# Patient Record
Sex: FEMALE | Race: WHITE | Employment: OTHER | URBAN - METROPOLITAN AREA
[De-identification: names, ages, dates, MRNs, and addresses within clinical notes are randomized per-mention and may not be internally consistent; named-entity substitution may affect disease eponyms.]

---

## 2017-02-13 RX ORDER — ERGOCALCIFEROL 1.25 MG/1
CAPSULE ORAL
Qty: 12 CAP | Refills: 2 | OUTPATIENT
Start: 2017-02-13

## 2017-02-13 NOTE — TELEPHONE ENCOUNTER
Juan Manuel Koenig [phone] advising patient that medication refill has been denied. She was asked to call [facility] to schedule follow up so medication can be refilled.

## 2017-02-14 ENCOUNTER — PATIENT MESSAGE (OUTPATIENT)
Dept: FAMILY MEDICINE CLINIC | Age: 62
End: 2017-02-14

## 2017-02-14 DIAGNOSIS — E55.9 VITAMIN D DEFICIENCY: ICD-10-CM

## 2017-02-14 DIAGNOSIS — E78.00 HYPERCHOLESTEROLEMIA: Primary | ICD-10-CM

## 2017-03-02 ENCOUNTER — HOSPITAL ENCOUNTER (OUTPATIENT)
Dept: LAB | Age: 62
Discharge: HOME | End: 2017-03-02
Payer: OTHER GOVERNMENT

## 2017-03-02 LAB
25(OH)D3 SERPL-MCNC: 54.6 NG/ML (ref 30–100)
ALBUMIN SERPL BCP-MCNC: 3.7 G/DL (ref 3.4–5)
ALBUMIN/GLOB SERPL: 1 {RATIO} (ref 0.8–1.7)
ALP SERPL-CCNC: 79 U/L (ref 45–117)
ALT SERPL-CCNC: 57 U/L (ref 13–56)
ANION GAP BLD CALC-SCNC: 4 MMOL/L (ref 3–18)
AST SERPL W P-5'-P-CCNC: 34 U/L (ref 15–37)
BILIRUB SERPL-MCNC: 0.6 MG/DL (ref 0.2–1)
BUN SERPL-MCNC: 11 MG/DL (ref 7–18)
BUN/CREAT SERPL: 14 (ref 12–20)
CALCIUM SERPL-MCNC: 9.1 MG/DL (ref 8.5–10.1)
CHLORIDE SERPL-SCNC: 104 MMOL/L (ref 100–108)
CHOLEST SERPL-MCNC: 208 MG/DL
CO2 SERPL-SCNC: 32 MMOL/L (ref 21–32)
CREAT SERPL-MCNC: 0.76 MG/DL (ref 0.6–1.3)
ERYTHROCYTE [DISTWIDTH] IN BLOOD BY AUTOMATED COUNT: 13.7 % (ref 11.6–14.5)
GLOBULIN SER CALC-MCNC: 3.6 G/DL (ref 2–4)
GLUCOSE SERPL-MCNC: 99 MG/DL (ref 74–99)
HCT VFR BLD AUTO: 40.9 % (ref 35–45)
HDLC SERPL-MCNC: 43 MG/DL (ref 40–60)
HDLC SERPL: 4.8 {RATIO} (ref 0–5)
HGB BLD-MCNC: 13.4 G/DL (ref 12–16)
LDLC SERPL CALC-MCNC: 123.6 MG/DL (ref 0–100)
LIPID PROFILE,FLP: ABNORMAL
MCH RBC QN AUTO: 28.1 PG (ref 24–34)
MCHC RBC AUTO-ENTMCNC: 32.8 G/DL (ref 31–37)
MCV RBC AUTO: 85.7 FL (ref 74–97)
PLATELET # BLD AUTO: 300 K/UL (ref 135–420)
PMV BLD AUTO: 11 FL (ref 9.2–11.8)
POTASSIUM SERPL-SCNC: 4.4 MMOL/L (ref 3.5–5.5)
PROT SERPL-MCNC: 7.3 G/DL (ref 6.4–8.2)
RBC # BLD AUTO: 4.77 M/UL (ref 4.2–5.3)
SODIUM SERPL-SCNC: 140 MMOL/L (ref 136–145)
TRIGL SERPL-MCNC: 207 MG/DL (ref ?–150)
VLDLC SERPL CALC-MCNC: 41.4 MG/DL
WBC # BLD AUTO: 8.4 K/UL (ref 4.6–13.2)

## 2017-03-02 PROCEDURE — 80061 LIPID PANEL: CPT | Performed by: FAMILY MEDICINE

## 2017-03-02 PROCEDURE — 85027 COMPLETE CBC AUTOMATED: CPT | Performed by: FAMILY MEDICINE

## 2017-03-02 PROCEDURE — 80053 COMPREHEN METABOLIC PANEL: CPT | Performed by: FAMILY MEDICINE

## 2017-03-02 PROCEDURE — 36415 COLL VENOUS BLD VENIPUNCTURE: CPT | Performed by: FAMILY MEDICINE

## 2017-03-02 PROCEDURE — 82306 VITAMIN D 25 HYDROXY: CPT | Performed by: FAMILY MEDICINE

## 2017-03-03 NOTE — PROGRESS NOTES
Spoke with patient. She was advised that the vit D levels look much better. And her overall labs look good. She voices understanding and has been scheduled for 3/21/17.

## 2017-03-08 DIAGNOSIS — F41.9 ANXIETY: ICD-10-CM

## 2017-03-08 DIAGNOSIS — E78.5 HYPERLIPIDEMIA WITH TARGET LDL LESS THAN 130: ICD-10-CM

## 2017-03-08 RX ORDER — VENLAFAXINE HYDROCHLORIDE 75 MG/1
75 CAPSULE, EXTENDED RELEASE ORAL DAILY
Qty: 90 CAP | Refills: 0 | Status: SHIPPED | OUTPATIENT
Start: 2017-03-08 | End: 2017-03-21 | Stop reason: SDUPTHER

## 2017-03-08 RX ORDER — ATORVASTATIN CALCIUM 40 MG/1
40 TABLET, FILM COATED ORAL
Qty: 90 TAB | Refills: 0 | Status: SHIPPED | OUTPATIENT
Start: 2017-03-08 | End: 2017-03-21 | Stop reason: SDUPTHER

## 2017-03-08 NOTE — TELEPHONE ENCOUNTER
From: Scott Mac  To: Linette Hancock MD  Sent: 3/8/2017 3:09 PM EST  Subject: Medication Renewal Request    Original authorizing provider: MD Scott Knowles would like a refill of the following medications:  venlafaxine-SR (EFFEXOR-XR) 75 mg capsule Linette Hancock MD]  atorvastatin (LIPITOR) 40 mg tablet Linette Hancock MD]    Preferred pharmacy: [place]    Comment:  Can I get these two medications refilled through [facility]. I will be running out before my appt on Mar 21. Thank you!

## 2017-03-21 ENCOUNTER — OFFICE VISIT (OUTPATIENT)
Dept: FAMILY MEDICINE CLINIC | Age: 62
End: 2017-03-21

## 2017-03-21 VITALS
WEIGHT: 169 LBS | RESPIRATION RATE: 16 BRPM | TEMPERATURE: 98.2 F | OXYGEN SATURATION: 98 % | DIASTOLIC BLOOD PRESSURE: 74 MMHG | SYSTOLIC BLOOD PRESSURE: 130 MMHG | BODY MASS INDEX: 29.95 KG/M2 | HEART RATE: 72 BPM | HEIGHT: 63 IN

## 2017-03-21 DIAGNOSIS — F41.9 ANXIETY: ICD-10-CM

## 2017-03-21 DIAGNOSIS — E55.9 VITAMIN D DEFICIENCY: ICD-10-CM

## 2017-03-21 DIAGNOSIS — E78.5 HYPERLIPIDEMIA WITH TARGET LDL LESS THAN 130: Primary | ICD-10-CM

## 2017-03-21 DIAGNOSIS — G43.809 OTHER MIGRAINE WITHOUT STATUS MIGRAINOSUS, NOT INTRACTABLE: ICD-10-CM

## 2017-03-21 DIAGNOSIS — K76.0 FATTY LIVER: ICD-10-CM

## 2017-03-21 DIAGNOSIS — Z80.8 FAMILY HISTORY OF SKIN CANCER: ICD-10-CM

## 2017-03-21 RX ORDER — ATORVASTATIN CALCIUM 40 MG/1
40 TABLET, FILM COATED ORAL
Qty: 90 TAB | Refills: 2 | Status: SHIPPED | OUTPATIENT
Start: 2017-03-21 | End: 2018-04-09 | Stop reason: SDUPTHER

## 2017-03-21 RX ORDER — ERGOCALCIFEROL 1.25 MG/1
50000 CAPSULE ORAL
Qty: 12 CAP | Refills: 3 | Status: SHIPPED | OUTPATIENT
Start: 2017-03-21 | End: 2018-04-09 | Stop reason: SDUPTHER

## 2017-03-21 RX ORDER — ZOLMITRIPTAN 2.5 MG/1
2.5 TABLET, FILM COATED ORAL AS NEEDED
Qty: 18 TAB | Refills: 3 | Status: SHIPPED | OUTPATIENT
Start: 2017-03-21

## 2017-03-21 RX ORDER — VENLAFAXINE HYDROCHLORIDE 75 MG/1
75 CAPSULE, EXTENDED RELEASE ORAL DAILY
Qty: 90 CAP | Refills: 2 | Status: SHIPPED | OUTPATIENT
Start: 2017-03-21 | End: 2018-04-09 | Stop reason: SDUPTHER

## 2017-03-21 NOTE — PATIENT INSTRUCTIONS
[facility]   [address] [facility], [address]  [place], [facility] [address]  [phone]         A Healthy Lifestyle: Care Instructions  Your Care Instructions  A healthy lifestyle can help you feel good, stay at a healthy weight, and have plenty of energy for both work and play. A healthy lifestyle is something you can share with your whole family. A healthy lifestyle also can lower your risk for serious health problems, such as high blood pressure, heart disease, and diabetes. You can follow a few steps listed below to improve your health and the health of your family. Follow-up care is a key part of your treatment and safety. Be sure to make and go to all appointments, and call your doctor if you are having problems. Its also a good idea to know your test results and keep a list of the medicines you take. How can you care for yourself at home? · Do not eat too much sugar, fat, or fast foods. You can still have dessert and treats now and then. The goal is moderation. · Start small to improve your eating habits. Pay attention to portion sizes, drink less juice and soda pop, and eat more fruits and vegetables. ¨ Eat a healthy amount of food. A 3-ounce serving of meat, for example, is about the size of a deck of cards. Fill the rest of your plate with vegetables and whole grains. ¨ Limit the amount of soda and sports drinks you have every day. Drink more water when you are thirsty. ¨ Eat at least 5 servings of fruits and vegetables every day. It may seem like a lot, but it is not hard to reach this goal. A serving or helping is 1 piece of fruit, 1 cup of vegetables, or 2 cups of leafy, raw vegetables. Have an apple or some carrot sticks as an afternoon snack instead of a candy bar. Try to have fruits and/or vegetables at every meal.  · Make exercise part of your daily routine. You may want to start with simple activities, such as walking, bicycling, or slow swimming.  Try to be active 30 to 60 minutes every day. You do not need to do all 30 to 60 minutes all at once. For example, you can exercise 3 times a day for 10 or 20 minutes. Moderate exercise is safe for most people, but it is always a good idea to talk to your doctor before starting an exercise program.  · Keep moving. Miley Knuckles the lawn, work in the garden, or [employer]. Take the stairs instead of the elevator at work. · If you smoke, quit. People who smoke have an increased risk for heart attack, stroke, cancer, and other lung illnesses. Quitting is hard, but there are ways to boost your chance of quitting tobacco for good. ¨ Use nicotine gum, patches, or lozenges. ¨ Ask your doctor about stop-smoking programs and medicines. ¨ Keep trying. In addition to reducing your risk of diseases in the future, you will notice some benefits soon after you stop using tobacco. If you have shortness of breath or asthma symptoms, they will likely get better within a few weeks after you quit. · Limit how much alcohol you drink. Moderate amounts of alcohol (up to 2 drinks a day for men, 1 drink a day for women) are okay. But drinking too much can lead to liver problems, high blood pressure, and other health problems. Family health  If you have a family, there are many things you can do together to improve your health. · Eat meals together as a family as often as possible. · Eat healthy foods. This includes fruits, vegetables, lean meats and dairy, and whole grains. · Include your family in your fitness plan. Most people think of activities such as jogging or tennis as the way to fitness, but there are many ways you and your family can be more active. Anything that makes you breathe hard and gets your heart pumping is exercise. Here are some tips:  ¨ Walk to do errands or to take your child to school or the bus. ¨ Go for a family bike ride after dinner instead of watching TV. Where can you learn more? Go to http://graciela-emani.info/.   Enter T142 in the search box to learn more about \"A Healthy Lifestyle: Care Instructions. \"  Current as of: July 26, 2016  Content Version: 11.1  © 8196-0136 [employer], [employer]. Care instructions adapted under license by [employer] (which disclaims liability or warranty for this information). If you have questions about a medical condition or this instruction, always ask your healthcare professional. [address] any warranty or liability for your use of this information.

## 2017-03-21 NOTE — PROGRESS NOTES
SUBJECTIVE  Chief Complaint   Patient presents with    Medication Refill     hyperchol, anxiety, vit d def      Follow-up for lab review for hypercholesterolemia, anxiety, liver cyst, fatty liver, and hypovitaminosis D. She is overdue by a few months. She is overdue to see Dr. Nati Sr for her annual as well by a few months. Hypercholesterolemia - Taking Lipitor 40mg with no side effects. No myalgias or cramping reported. No chest pain or dyspnea. She is overdue to see GI. She is up to date on colorectal cancer screening until 9/2017 due to an 8mm polyp. The patient presents says that her anxiety is very well-controlled on Effexor. The patient denies any harmful ideation. Takes Zomig as needed for rare migraines. OBJECTIVE    Blood pressure 130/74, pulse 72, temperature 98.2 °F (36.8 °C), temperature source Oral, resp. rate 16, height 5' 3\" (1.6 m), weight 169 lb (76.7 kg), SpO2 98 %. General:  Alert, cooperative, well appearing, in no apparent distress. Chest: clear, no w/r/r. Heart: normal s1s2, RRR, no murmurs. Ext: no swelling present. Psych: normal affect. Mood good. Oriented x 3. Judgement and insight intact.      Results for orders placed or performed during the hospital encounter of 03/02/17   CBC W/O DIFF   Result Value Ref Range    WBC 8.4 4.6 - 13.2 K/uL    RBC 4.77 4.20 - 5.30 M/uL    HGB 13.4 12.0 - 16.0 g/dL    HCT 40.9 35.0 - 45.0 %    MCV 85.7 74.0 - 97.0 FL    MCH 28.1 24.0 - 34.0 PG    MCHC 32.8 31.0 - 37.0 g/dL    RDW 13.7 11.6 - 14.5 %    PLATELET 663 220 - 056 K/uL    MPV 11.0 9.2 - 11.8 FL   LIPID PANEL   Result Value Ref Range    LIPID PROFILE          Cholesterol, total 208 (H) <200 MG/DL    Triglyceride 207 (H) <150 MG/DL    HDL Cholesterol 43 40 - 60 MG/DL    LDL, calculated 123.6 (H) 0 - 100 MG/DL    VLDL, calculated 41.4 MG/DL    CHOL/HDL Ratio 4.8 0 - 5.0     METABOLIC PANEL, COMPREHENSIVE   Result Value Ref Range    Sodium 140 136 - 145 mmol/L Potassium 4.4 3.5 - 5.5 mmol/L    Chloride 104 100 - 108 mmol/L    CO2 32 21 - 32 mmol/L    Anion gap 4 3.0 - 18 mmol/L    Glucose 99 74 - 99 mg/dL    BUN 11 7.0 - 18 MG/DL    Creatinine 0.76 0.6 - 1.3 MG/DL    BUN/Creatinine ratio 14 12 - 20      GFR est AA >60 >60 ml/min/1.73m2    GFR est non-AA >60 >60 ml/min/1.73m2    Calcium 9.1 8.5 - 10.1 MG/DL    Bilirubin, total 0.6 0.2 - 1.0 MG/DL    ALT (SGPT) 57 (H) 13 - 56 U/L    AST (SGOT) 34 15 - 37 U/L    Alk. phosphatase 79 45 - 117 U/L    Protein, total 7.3 6.4 - 8.2 g/dL    Albumin 3.7 3.4 - 5.0 g/dL    Globulin 3.6 2.0 - 4.0 g/dL    A-G Ratio 1.0 0.8 - 1.7     VITAMIN D, 25 HYDROXY   Result Value Ref Range    Vitamin D 25-Hydroxy 54.6 30 - 100 ng/mL     ASSESSMENT / PLAN    ICD-10-CM ICD-9-CM    1. Hyperlipidemia with target LDL less than 130 E78.5 272.4 atorvastatin (LIPITOR) 40 mg tablet   2. Anxiety F41.9 300.00 venlafaxine-SR (EFFEXOR-XR) 75 mg capsule   3. Vitamin D deficiency E55.9 268.9    4. Fatty liver K76.0 571.8    5. Other migraine without status migrainosus, not intractable G43.809  ZOLMitriptan (ZOMIG) 2.5 mg tablet   6. Family history of skin cancer Z80.8 V16.8      Reviewed labs - no hepatotoxicity. Hyperlipidemia - Continue Lipitor 40mg nightly. Encourage diet and exercise. Anxiety - Continue Effexor. Well-controlled. Vit D def - controlled. Continue vitamin D 50,000 weekly. Check again in 12 months. Liver cyst / fatty liver - advised on importance of GI follow-up. Due for CRCS in Sept which I shared with her. Migraines - Zomig as needed for migraines. Advised on importance of CPE / well woman exam.     Ronnie Oden on seeing a dermatologist and provided contact info due to her family history of skin ca in her father. All chart history elements were reviewed by me at the time of the visit even though marked at time of note closure. Patient understands our medical plan.  Patient has provided input and agrees with goals. Alternatives have been explained and offered. All questions answered. The patient is to call if condition worsens or fails to improve.      Follow-up Disposition:  Return 2-4 weeks for well woman exam with Dr. Goldy Tobar.

## 2017-03-21 NOTE — MR AVS SNAPSHOT
Visit Information Date & Time Provider Department Dept. Phone Encounter #  
 3/21/2017  9:15 AM Madelaine Erazo, [address] [id number] Follow-up Instructions Return 2-4 weeks for well woman exam with Dr. Mir Canal Date Due  
 PAP AKA CERVICAL CYTOLOGY 8/2/2016 ZOSTER VACCINE AGE 60> 10/15/2016 COLONOSCOPY 9/12/2017 BREAST CANCER SCRN MAMMOGRAM 1/28/2018 DTaP/Tdap/Td series (2 - Td) 7/25/2023 Allergies as of 3/21/2017  Review Complete On: 3/21/2017 By: Madelaine Erazo MD  
 No Known Allergies Current Immunizations  Reviewed on 3/21/2017 Name Date Influenza Vaccine Split 11/1/2012 Tdap 7/25/2013 Reviewed by Olinda Diggs on 3/21/2017 at  9:04 AM  
You Were Diagnosed With   
  
 Codes Comments Hyperlipidemia with target LDL less than 130    -  Primary ICD-10-CM: E78.5 ICD-9-CM: 272.4 Anxiety     ICD-10-CM: F41.9 ICD-9-CM: 300.00 Vitamin D deficiency     ICD-10-CM: E55.9 ICD-9-CM: 268.9 Fatty liver     ICD-10-CM: K76.0 ICD-9-CM: 571.8 Other migraine without status migrainosus, not intractable     ICD-10-CM: X27.880 Family history of skin cancer     ICD-10-CM: Z80.8 ICD-9-CM: V16.8 Vitals BP Pulse Temp Resp Height(growth percentile) Weight(growth percentile) 130/74 (BP 1 Location: Left arm, BP Patient Position: Sitting) 72 98.2 °F (36.8 °C) (Oral) 16 5' 3\" (1.6 m) 169 lb (76.7 kg) SpO2 BMI OB Status Smoking Status 98% 29.94 kg/m2 Postmenopausal Never Smoker Vitals History BMI and BSA Data Body Mass Index Body Surface Area  
 29.94 kg/m 2 1.85 m 2 [facility] [address] [phone] Your Updated Medication List  
  
   
This list is accurate as of: 3/21/17  9:51 AM.  Always use your most recent med list.  
  
  
  
  
 atorvastatin 40 mg tablet Commonly known as:  LIPITOR Take 1 Tab by mouth nightly.  
  
 ergocalciferol 50,000 unit capsule Commonly known as:  ERGOCALCIFEROL Take 1 Cap by mouth every seven (7) days. MULTIVITAMIN PO Take 1 Tab by mouth daily. venlafaxine-SR 75 mg capsule Commonly known as:  EFFEXOR-XR Take 1 Cap by mouth daily. VITAMIN E PO Take 800 mg by mouth daily. ZOLMitriptan 2.5 mg tablet Commonly known as:  ZOMIG Take 1 Tab by mouth as needed for Migraine. Prescriptions Sent to Pharmacy Refills  
 ergocalciferol (ERGOCALCIFEROL) 50,000 unit capsule 3 Sig: Take 1 Cap by mouth every seven (7) days. Class: Normal  
 Pharmacy: [address], [address] Ph #: [phone] Route: Oral  
 atorvastatin (LIPITOR) 40 mg tablet 2 Sig: Take 1 Tab by mouth nightly. Class: Normal  
 Pharmacy: [address], [address] Ph #: [phone] Route: Oral  
 venlafaxine-SR (EFFEXOR-XR) 75 mg capsule 2 Sig: Take 1 Cap by mouth daily. Class: Normal  
 Pharmacy: [address], [address] Ph #: [phone] Route: Oral  
 ZOLMitriptan (ZOMIG) 2.5 mg tablet 3 Sig: Take 1 Tab by mouth as needed for Migraine. Class: Normal  
 Pharmacy: [address], [address] Ph #: [phone] Route: Oral  
  
Follow-up Instructions Return 2-4 weeks for well woman exam with Dr. Nati Sr.  
  
  
Patient Instructions [facility] [address] [place] [address], [address] 
[phone] [facility]! Dear Ruth Bone: Thank you for requesting a [employer] account. Our records indicate that you already have an active [employer] account. You can access your account anytime at [email or web address] Did you know that you can access your hospital and ER discharge instructions at any time in [employer]? You can also review all of your test results from your hospital stay or ER visit. Additional Information If you have questions, please visit the Frequently Asked Questions section of the [employer] website at [email or web address] Remember, SwingTime is NOT to be used for urgent needs. For medical emergencies, dial 911. Now available from your iPhone and Android! Please provide this summary of care documentation to your next provider. Your primary care clinician is listed as Mihai Low. If you have any questions after today's visit, please call [phone].

## 2017-03-21 NOTE — PROGRESS NOTES
1. Have you been to the ER, urgent care clinic since your last visit? Hospitalized since your last visit? No    2. Have you seen or consulted any other health care providers outside of the [address] [place] [address] since your last visit? Include any pap smears or colon screening.  No

## 2017-04-25 ENCOUNTER — OFFICE VISIT (OUTPATIENT)
Dept: FAMILY MEDICINE CLINIC | Age: 62
End: 2017-04-25

## 2017-04-25 VITALS
DIASTOLIC BLOOD PRESSURE: 91 MMHG | BODY MASS INDEX: 29.41 KG/M2 | HEIGHT: 63 IN | TEMPERATURE: 99.1 F | HEART RATE: 98 BPM | SYSTOLIC BLOOD PRESSURE: 158 MMHG | WEIGHT: 166 LBS | OXYGEN SATURATION: 93 %

## 2017-04-25 DIAGNOSIS — R03.0 ELEVATED BLOOD PRESSURE READING: ICD-10-CM

## 2017-04-25 DIAGNOSIS — M79.604 PAIN OF RIGHT LOWER EXTREMITY: Primary | ICD-10-CM

## 2017-04-25 DIAGNOSIS — M54.30 SCIATIC LEG PAIN: ICD-10-CM

## 2017-04-25 RX ORDER — IBUPROFEN 800 MG/1
800 TABLET ORAL
COMMUNITY
End: 2020-08-18

## 2017-04-25 RX ORDER — KETOROLAC TROMETHAMINE 30 MG/ML
60 INJECTION, SOLUTION INTRAMUSCULAR; INTRAVENOUS ONCE
Qty: 1 VIAL | Refills: 0
Start: 2017-04-25 | End: 2017-04-25

## 2017-04-25 NOTE — PATIENT INSTRUCTIONS
Leg Pain: Care Instructions  Your Care Instructions  Many things can cause leg pain. Too much exercise or overuse can cause a muscle cramp (or charley horse). You can get leg cramps from not eating a balanced diet that has enough potassium, calcium, and other minerals. If you do not drink enough fluids or are taking certain medicines, you may develop leg cramps. Other causes of leg pain include injuries, blood flow problems, nerve damage, and twisted and enlarged veins (varicose veins). You can usually ease pain with self-care. Your doctor may recommend that you rest your leg and keep it elevated. Follow-up care is a key part of your treatment and safety. Be sure to make and go to all appointments, and call your doctor if you are having problems. Its also a good idea to know your test results and keep a list of the medicines you take. How can you care for yourself at home? · Take pain medicines exactly as directed. ¨ If the doctor gave you a prescription medicine for pain, take it as prescribed. ¨ If you are not taking a prescription pain medicine, ask your doctor if you can take an over-the-counter medicine. · Take any other medicines exactly as prescribed. Call your doctor if you think you are having a problem with your medicine. · Rest your leg while you have pain, and avoid standing for long periods of time. · Prop up your leg at or above the level of your heart when possible. · Make sure you are eating a balanced diet that is rich in calcium, potassium, and magnesium, especially if you are pregnant. · If directed by your doctor, put ice or a cold pack on the area for 10 to 20 minutes at a time. Put a thin cloth between the ice and your skin. · Your leg may be in a splint, a brace, or an elastic bandage, and you may have crutches to help you walk. Follow your doctor's directions about how long to wear supports and how to use the crutches. When should you call for help?   Call 911 anytime you think you may need emergency care. For example, call if:  · You have sudden chest pain and shortness of breath, or you cough up blood. · Your leg is cool or pale or changes color. Call your doctor now or seek immediate medical care if:  · You have increasing or severe pain. · Your leg suddenly feels weak and you cannot move it. · You have signs of a blood clot, such as:  ¨ Pain in your calf, back of the knee, thigh, or groin. ¨ Redness and swelling in your leg or groin. · You have signs of infection, such as:  ¨ Increased pain, swelling, warmth, or redness. ¨ Red streaks leading from the sore area. ¨ Pus draining from a place on your leg. ¨ A fever. · You cannot bear weight on your leg. Watch closely for changes in your health, and be sure to contact your doctor if:  · You do not get better as expected. Where can you learn more? Go to http://graciela-emani.info/. Enter H301 in the search box to learn more about \"Leg Pain: Care Instructions. \"  Current as of: May 27, 2016  Content Version: 11.2  © 4014-9458 [employer]. Care instructions adapted under license by [employer] (which disclaims liability or warranty for this information). If you have questions about a medical condition or this instruction, always ask your healthcare professional. [address] any warranty or liability for your use of this information. Sciatica: Care Instructions  Your Care Instructions    Sciatica (say \"gmt-HA-uz-kuh\") is an irritation of one of the sciatic nerves, which come from the spinal cord in the lower back. The sciatic nerves and their branches extend down through the buttock to the foot. Sciatica can develop when an injured disc in the back presses against a spinal nerve root. Its main symptom is pain, numbness, or weakness that is often worse in the leg or foot than in the back. Sciatica often will improve and go away with time.  Early treatment usually includes medicines and exercises to relieve pain. Follow-up care is a key part of your treatment and safety. Be sure to make and go to all appointments, and call your doctor if you are having problems. It's also a good idea to know your test results and keep a list of the medicines you take. How can you care for yourself at home? · Take pain medicines exactly as directed. ¨ If the doctor gave you a prescription medicine for pain, take it as prescribed. ¨ If you are not taking a prescription pain medicine, ask your doctor if you can take an over-the-counter medicine. · Use heat or ice to relieve pain. ¨ To apply heat, put a warm water bottle, heating pad set on low, or warm cloth on your back. Do not go to sleep with a heating pad on your skin. ¨ To use ice, put ice or a cold pack on the area for 10 to 20 minutes at a time. Put a thin cloth between the ice and your skin. · Avoid sitting if possible, unless it feels better than standing. · Alternate lying down with short walks. Increase your walking distance as you are able to without making your symptoms worse. · Do not do anything that makes your symptoms worse. When should you call for help? Call 911 anytime you think you may need emergency care. For example, call if:  · You are unable to move a leg at all. Call your doctor now or seek immediate medical care if:  · You have new or worse symptoms in your legs or buttocks. Symptoms may include:  ¨ Numbness or tingling. ¨ Weakness. ¨ Pain. · You lose bladder or bowel control. Watch closely for changes in your health, and be sure to contact your doctor if:  · You are not getting better as expected. Where can you learn more? Go to http://graciela-emani.info/. Enter [phone] in the search box to learn more about \"Sciatica: Care Instructions. \"  Current as of: May 23, 2016  Content Version: 11.2  © 3304-3820 [employer].  Care instructions adapted under license by [facility] (which disclaims liability or warranty for this information). If you have questions about a medical condition or this instruction, always ask your healthcare professional. [address] any warranty or liability for your use of this information. Ketorolac (By mouth)   Ketorolac Tromethamine (yousif-toe-ROLE-ak troe-METH-a-meen)  Treats severe pain. This medicine is an NSAID. Brand Name(s):Toradol   There may be other brand names for this medicine. When This Medicine Should Not Be Used: You should not use this medicine if you have had an allergic reaction (including asthma) to ketorolac, aspirin, or other NSAID medicines such as Aleve®, Celebrex®, Indocin®, Motrin®, or Naprosyn®. You should not use this medicine if you have a stomach ulcer, a bleeding disorder, or if you are pregnant or breastfeeding. Do not take this medicine if you have advanced kidney disease. Do not use this medicine right before or right after having coronary artery bypass graft (CABG), a type of heart surgery. You should not take this medicine if you are using probenecid (Probalan®). How to Use This Medicine:   Tablet  · Your doctor will tell you how much medicine to use. Do not use more than directed. · Take your tablets with a full glass of water. · You may take this medicine with food or milk so it does not upset your stomach. · Use this medicine for the shortest time possible, never more than 5 days, and in the smallest dose possible. This will help lower the risk of side effects. · This medicine should come with a Medication Guide. Ask your pharmacist for a copy if you do not have one. If a dose is missed:   · Take a dose as soon as you remember. If it is almost time for your next dose, wait until then and take a regular dose. Do not take extra medicine to make up for a missed dose.   How to Store and Dispose of This Medicine:   · Store the medicine in a closed container at room temperature, away from heat, moisture, and direct light. · Ask your pharmacist, doctor, or health caregiver about the best way to dispose of any outdated medicine or medicine no longer needed. · Keep all medicine out of the reach of children. Never share your medicine with anyone. Drugs and Foods to Avoid:   Ask your doctor or pharmacist before using any other medicine, including over-the-counter medicines, vitamins, and herbal products. · Do not use any other NSAID medicine unless your doctor says it is okay. Some other NSAIDs are aspirin, diclofenac, ibuprofen, naproxen, Advil®, Aleve®, Celebrex®, Ecotrin®, Motrin®, or Voltaren®. · Make sure your doctor knows if you are also using aspirin, a blood thinner (such as warfarin, Coumadin®), or a steroid medicine (such as cortisone, dexamethasone, hydrocortisone, methylprednisolone, prednisolone, prednisone, or Orapred®). Tell your doctor if you are using methotrexate [place]), or a diuretic or \"water pill\" (such as furosemide, hydrochlorothiazide [HCTZ], torsemide, Demadex®, or Lasix®). · Make sure your doctor knows if you are also using fluoxetine, heparin, lithium, thiothixene, Eskalith®, Navane®, or Prozac®. Tell your doctor if you are using a blood pressure medicine (such as enalapril, lisinopril, Accupril®, Lotensin®, Lotrel®, Monopril®, Prinivil®, Vasotec®, or Zestril®). Your doctor will need to know if you are using medicine to treat seizures such as phenytoin (Dilantin®) or carbamazepine (Tegretol®), or sedatives such as alprazolam (Xanax®). Warnings While Using This Medicine:   · Make sure your doctor knows if you have kidney disease, liver disease, heart disease, circulation problems, untreated high blood pressure, or a history of asthma. · If you are more than 12years of age, you should not use this medicine for more than 5 days unless your doctor has told you to. · If you are 12years of age or younger, you should not use more than a single dose.   · This medicine may raise your risk of having a heart attack or stroke. This is more likely in people who already have heart disease. People who use this medicine for a long time might also have a higher risk. · This medicine may cause bleeding in your stomach or intestines. These problems can happen without warning signs. This is more likely if you have had a stomach ulcer in the past, if you smoke or drink alcohol regularly, if you are over 61years old, if you are in poor health, or if you are using certain other medicines (a steroid medicine or a blood thinner). · This medicine may make you dizzy or drowsy. Avoid driving, using machines, or doing anything else that could be dangerous if you are not alert. Possible Side Effects While Using This Medicine:   Call your doctor right away if you notice any of these side effects:  · Allergic reaction: Itching or hives, swelling in your face or hands, swelling or tingling in your mouth or throat, chest tightness, trouble breathing  · Blistering, peeling, or red skin rash. · Bloody or black, tarry stools. · Change in how much or how often you urinate. · Chest pain, shortness of breath, or coughing up blood. · Dark-colored urine or pale stools. · Flu-like symptoms. · Numbness or weakness in your arm or leg, or on one side of your body. · Pain in your lower leg (calf). · Problems with vision, speech, or walking. · Shortness of breath, cold sweat, and bluish-colored skin. · Skin rash or blisters with fever. · Sudden and severe stomach pain, nausea, vomiting, fever, and lightheadedness. · Sudden or severe headache. · Swelling in your hands, ankles, or feet. · Unusual bleeding or bruising. · Unusual tiredness or weakness. · Unusual weight gain. · Vomiting blood or something that looks like coffee grounds. · Yellowing of your skin or the whites of your eyes.   If you notice these less serious side effects, talk with your doctor:   · Changes in your vision. · Diarrhea, constipation, or indigestion. · Headache. · Mild stomach pain. · Ringing in your ears. If you notice other side effects that you think are caused by this medicine, tell your doctor. Call your doctor for medical advice about side effects. You may report side effects to FDA at 5-812-FDA-7535  © 2016 [address] is for End User's use only and may not be sold, redistributed or otherwise used for commercial purposes. The above information is an  only. It is not intended as medical advice for individual conditions or treatments. Talk to your doctor, nurse or pharmacist before following any medical regimen to see if it is safe and effective for you.

## 2017-04-25 NOTE — PROGRESS NOTES
1. Have you been to the ER, urgent care clinic since your last visit? Hospitalized since your last visit? No    2. Have you seen or consulted any other health care providers outside of the [facility] since your last visit? Include any pap smears or colon screening. No    Is someone accompanying this pt? no    Is the patient using any DME equipment during OV? no      Chief Complaint   Patient presents with    Back Pain     Pt states that she has been having sciatic nerve pain since Saturday. Pt states that she is currently taking motrin 800 mg. Pt states that it gives some rlief. Pt states that the pain is only severe when sitting.

## 2017-04-25 NOTE — PROGRESS NOTES
HPI  Dameon Nunez is a 62 y.o. female  Chief Complaint   Patient presents with    Back Pain     Pt states that she has been having sciatic nerve pain since Saturday. Pt states that she is currently taking motrin 800 mg. Pt states that it gives some rlief. Pt states that the pain is only severe when sitting. Reports this pain occurred at [place] and she took ibuprofen which caused the pain to dissolve by the third day. Reports ibuprofen does give some relief. Reports pain only occurs when sitting. Reports pain is zheng and tingling in right leg and is 7/10. Patient reports pain starts in the middle of her buttock area and radiates down to right leg. Reports pain has been present since Saturday (three days ago). Reports pain has decreased since Saturday (three days ago) but has not gone away. Denies spasms. Denies back pain. Denies calf pain. Reports blood pressure is elevated today and she thinks it is because she is in pain. Past Medical History  Past Medical History:   Diagnosis Date    Elevated blood pressure reading without diagnosis of hypertension     Fatty liver     sees GI    Fibroadenoma of right breast 8/2013    rpt US 3/14    Generalized anxiety disorder     High cholesterol     Hx of colonoscopy 09/12/2012    Dr. Herman Clay, due 9/2017, 8mm polyp    Liver cyst     sees GI, recommended 1 year follow-up (11/2014 due)    Migraine without aura, without mention of intractable migraine without mention of status migrainosus     Osteopenia     Other and unspecified hyperlipidemia     Psychiatric disorder     anxiety       Surgical History  History reviewed. No pertinent surgical history. Medications  Current Outpatient Prescriptions   Medication Sig Dispense Refill    ibuprofen (MOTRIN) 800 mg tablet Take 800 mg by mouth every six (6) hours as needed for Pain.  ergocalciferol (ERGOCALCIFEROL) 50,000 unit capsule Take 1 Cap by mouth every seven (7) days.  12 Cap 3    atorvastatin (LIPITOR) 40 mg tablet Take 1 Tab by mouth nightly. 90 Tab 2    venlafaxine-SR (EFFEXOR-XR) 75 mg capsule Take 1 Cap by mouth daily. 90 Cap 2    ZOLMitriptan (ZOMIG) 2.5 mg tablet Take 1 Tab by mouth as needed for Migraine. 18 Tab 3    MULTIVITAMIN PO Take 1 Tab by mouth daily.  VITAMIN E ACETATE (VITAMIN E PO) Take 800 mg by mouth daily. Allergies  No Known Allergies    Family History  Family History   Problem Relation Age of Onset    Hypertension Father     High Cholesterol Father     Cancer Mother      brain    Osteoporosis Mother     Stroke Mother     Other Mother      skin ca - basal cell    Diabetes Paternal Grandfather     Heart Attack Paternal Grandfather     Stroke Paternal Grandmother        Social History  Social History     Social History    Marital status:      Spouse name: N/A    Number of children: N/A    Years of education: N/A     Occupational History    stay at home mom      Social History Main Topics    Smoking status: Never Smoker    Smokeless tobacco: Never Used    Alcohol use 1.0 oz/week     2 Glasses of wine per week      Comment: occ    Drug use: No    Sexual activity: Yes     Partners: Male     Other Topics Concern    Not on file     Social History Narrative       Problem List  Patient Active Problem List   Diagnosis Code    Migraines G43.909    Anxiety F41.9    Hyperlipidemia with target LDL less than 130 E78.5    Right-sided chest wall pain R07.89    Vitamin D deficiency E55.9    Fatty liver K76.0       Review of Systems  Review of Systems   Constitutional: Negative for chills and fever. Respiratory: Negative for shortness of breath. Cardiovascular: Negative for chest pain and palpitations. Gastrointestinal: Negative for abdominal pain, diarrhea, nausea and vomiting. Musculoskeletal: Negative for back pain and falls. Neurological: Positive for tingling. Negative for dizziness.        Vital Signs  Vitals:    04/25/17 1156   BP: (!) 158/91   Pulse: 98   Temp: 99.1 °F (37.3 °C)   TempSrc: Oral   SpO2: 93%   Weight: 166 lb (75.3 kg)   Height: 5' 3\" (1.6 m)   PainSc:   7       Physical Exam  Physical Exam    Diagnostics  Orders Placed This Encounter    KETOROLAC TROMETHAMINE INJ     Order Specific Question:   Dose     Answer:   60mg     Order Specific Question:   Site     Answer:   RIGHT GLUTEUS     Order Specific Question:   Expiration Date     Answer:   2018     Order Specific Question:   Lot#     Answer:   [id number]     Order Specific Question:        Answer:   [employer] Kabi     Order Specific Question:   Charge Quantity? Answer:   4     Order Specific Question:   Perfomed by/Witnessed by: Answer:   Sanchez Brennan LPN     Order Specific Question:   NDC#     Answer:   15607-741-50    MI THER/PROPH/DIAG INJECTION, SUBCUT/IM    ibuprofen (MOTRIN) 800 mg tablet     Sig: Take 800 mg by mouth every six (6) hours as needed for Pain.  ketorolac tromethamine (TORADOL) 60 mg/2 mL soln     Si mL by IntraMUSCular route once for 1 dose.      Dispense:  1 Vial     Refill:  0       Results  Results for orders placed or performed during the hospital encounter of 17   CBC W/O DIFF   Result Value Ref Range    WBC 8.4 4.6 - 13.2 K/uL    RBC 4.77 4.20 - 5.30 M/uL    HGB 13.4 12.0 - 16.0 g/dL    HCT 40.9 35.0 - 45.0 %    MCV 85.7 74.0 - 97.0 FL    MCH 28.1 24.0 - 34.0 PG    MCHC 32.8 31.0 - 37.0 g/dL    RDW 13.7 11.6 - 14.5 %    PLATELET 785 268 - 640 K/uL    MPV 11.0 9.2 - 11.8 FL   LIPID PANEL   Result Value Ref Range    LIPID PROFILE          Cholesterol, total 208 (H) <200 MG/DL    Triglyceride 207 (H) <150 MG/DL    HDL Cholesterol 43 40 - 60 MG/DL    LDL, calculated 123.6 (H) 0 - 100 MG/DL    VLDL, calculated 41.4 MG/DL    CHOL/HDL Ratio 4.8 0 - 5.0     METABOLIC PANEL, COMPREHENSIVE   Result Value Ref Range    Sodium 140 136 - 145 mmol/L    Potassium 4.4 3.5 - 5.5 mmol/L    Chloride 104 100 - 108 mmol/L    CO2 32 21 - 32 mmol/L    Anion gap 4 3.0 - 18 mmol/L    Glucose 99 74 - 99 mg/dL    BUN 11 7.0 - 18 MG/DL    Creatinine 0.76 0.6 - 1.3 MG/DL    BUN/Creatinine ratio 14 12 - 20      GFR est AA >60 >60 ml/min/1.73m2    GFR est non-AA >60 >60 ml/min/1.73m2    Calcium 9.1 8.5 - 10.1 MG/DL    Bilirubin, total 0.6 0.2 - 1.0 MG/DL    ALT (SGPT) 57 (H) 13 - 56 U/L    AST (SGOT) 34 15 - 37 U/L    Alk. phosphatase 79 45 - 117 U/L    Protein, total 7.3 6.4 - 8.2 g/dL    Albumin 3.7 3.4 - 5.0 g/dL    Globulin 3.6 2.0 - 4.0 g/dL    A-G Ratio 1.0 0.8 - 1.7     VITAMIN D, 25 HYDROXY   Result Value Ref Range    Vitamin D 25-Hydroxy 54.6 30 - 100 ng/mL       Assessment and Plan  Edwige Sparks was seen today for back pain. Diagnoses and all orders for this visit:    Pain of right lower extremity  -     KETOROLAC TROMETHAMINE INJ  -     AL THER/PROPH/DIAG INJECTION, SUBCUT/IM    Sciatic leg pain    Elevated blood pressure reading    Other orders  -     ketorolac tromethamine (TORADOL) 60 mg/2 mL soln; 2 mL by IntraMUSCular route once for 1 dose. Discussed blood pressure with patient in detail. Discussed signs and symptoms of stroke and MI. Patient verbalized understanding. Patient to follow up with PCP within 2 weeks for blood pressure recheck. Patient to implement lifestyle modifications. Side effects of Toradol reviewed with patient and handout given. Patient advised not to take OTC Ibuprofen due to IM Ketorolac injection today. Patient instructed to use OTC tylenol if need. Patient verbalized understanding. After care summary printed and reviewed with patient. Plan reviewed with patient. Questions answered. Patient verbalized understanding of plan and is in agreement with plan. Patient to follow up in two weeks or earlier if symptoms worsen do not improve.      TRAN Esparza

## 2017-04-25 NOTE — MR AVS SNAPSHOT
Visit Information Date & Time Provider Department Dept. Phone Encounter #  
 4/25/2017 11:45 AM Amy Bunn, NP [address] [id number] Follow-up Instructions Return if symptoms worsen or fail to improve. Upcoming Health Maintenance Date Due  
 PAP AKA CERVICAL CYTOLOGY 8/2/2016 ZOSTER VACCINE AGE 60> 10/15/2016 COLONOSCOPY 9/12/2017 BREAST CANCER SCRN MAMMOGRAM 1/28/2018 DTaP/Tdap/Td series (2 - Td) 7/25/2023 Allergies as of 4/25/2017  Review Complete On: 4/25/2017 By: Kaylene Logan LPN No Known Allergies Current Immunizations  Reviewed on 3/21/2017 Name Date Influenza Vaccine Split 11/1/2012 Tdap 7/25/2013 Not reviewed this visit You Were Diagnosed With   
  
 Codes Comments Pain of right lower extremity    -  Primary ICD-10-CM: M79.604 ICD-9-CM: 729.5 Sciatic leg pain     ICD-10-CM: M54.30 ICD-9-CM: 724.3 Vitals BP Pulse Temp Height(growth percentile) Weight(growth percentile) SpO2  
 (!) 158/91 (BP 1 Location: Right arm, BP Patient Position: Sitting) 98 99.1 °F (37.3 °C) (Oral) 5' 3\" (1.6 m) 166 lb (75.3 kg) 93% BMI OB Status Smoking Status 29.41 kg/m2 Postmenopausal Never Smoker BMI and BSA Data Body Mass Index Body Surface Area  
 29.41 kg/m 2 1.83 m 2 Preferred Pharmacy Pharmacy Name Phone Shoaib Enriquez, [address] Your Updated Medication List  
  
   
This list is accurate as of: 4/25/17 12:22 PM.  Always use your most recent med list.  
  
  
  
  
 atorvastatin 40 mg tablet Commonly known as:  LIPITOR Take 1 Tab by mouth nightly.  
  
 ergocalciferol 50,000 unit capsule Commonly known as:  ERGOCALCIFEROL Take 1 Cap by mouth every seven (7) days. ibuprofen 800 mg tablet Commonly known as:  MOTRIN Take 800 mg by mouth every six (6) hours as needed for Pain. ketorolac tromethamine 60 mg/2 mL Soln Commonly known as:  TORADOL  
2 mL by IntraMUSCular route once for 1 dose. MULTIVITAMIN PO Take 1 Tab by mouth daily. venlafaxine-SR 75 mg capsule Commonly known as:  EFFEXOR-XR Take 1 Cap by mouth daily. VITAMIN E PO Take 800 mg by mouth daily. ZOLMitriptan 2.5 mg tablet Commonly known as:  ZOMIG Take 1 Tab by mouth as needed for Migraine. We Performed the Following KETOROLAC TROMETHAMINE INJ [ [facility]] ID THER/PROPH/DIAG INJECTION, SUBCUT/IM [id number] CPT(R)] Follow-up Instructions Return if symptoms worsen or fail to improve. Patient Instructions Leg Pain: Care Instructions Your Care Instructions Many things can cause leg pain. Too much exercise or overuse can cause a muscle cramp (or charley horse). You can get leg cramps from not eating a balanced diet that has enough potassium, calcium, and other minerals. If you do not drink enough fluids or are taking certain medicines, you may develop leg cramps. Other causes of leg pain include injuries, blood flow problems, nerve damage, and twisted and enlarged veins (varicose veins). You can usually ease pain with self-care. Your doctor may recommend that you rest your leg and keep it elevated. Follow-up care is a key part of your treatment and safety. Be sure to make and go to all appointments, and call your doctor if you are having problems. Its also a good idea to know your test results and keep a list of the medicines you take. How can you care for yourself at home? · Take pain medicines exactly as directed. ¨ If the doctor gave you a prescription medicine for pain, take it as prescribed. ¨ If you are not taking a prescription pain medicine, ask your doctor if you can take an over-the-counter medicine. · Take any other medicines exactly as prescribed. Call your doctor if you think you are having a problem with your medicine. · Rest your leg while you have pain, and avoid standing for long periods of time. · Prop up your leg at or above the level of your heart when possible. · Make sure you are eating a balanced diet that is rich in calcium, potassium, and magnesium, especially if you are pregnant. · If directed by your doctor, put ice or a cold pack on the area for 10 to 20 minutes at a time. Put a thin cloth between the ice and your skin. · Your leg may be in a splint, a brace, or an elastic bandage, and you may have crutches to help you walk. Follow your doctor's directions about how long to wear supports and how to use the crutches. When should you call for help? Call 911 anytime you think you may need emergency care. For example, call if: 
· You have sudden chest pain and shortness of breath, or you cough up blood. · Your leg is cool or pale or changes color. Call your doctor now or seek immediate medical care if: 
· You have increasing or severe pain. · Your leg suddenly feels weak and you cannot move it. · You have signs of a blood clot, such as: 
¨ Pain in your calf, back of the knee, thigh, or groin. ¨ Redness and swelling in your leg or groin. · You have signs of infection, such as: 
¨ Increased pain, swelling, warmth, or redness. ¨ Red streaks leading from the sore area. ¨ Pus draining from a place on your leg. ¨ A fever. · You cannot bear weight on your leg. Watch closely for changes in your health, and be sure to contact your doctor if: 
· You do not get better as expected. Where can you learn more? Go to http://graciela-emani.info/. Enter U400 in the search box to learn more about \"Leg Pain: Care Instructions. \" Current as of: May 27, 2016 Content Version: 11.2 © 9738-1826 [employer]. Care instructions adapted under license by [employer] (which disclaims liability or warranty for this information).  If you have questions about a medical condition or this instruction, always ask your healthcare professional. [place] [address] any warranty or liability for your use of this information. Sciatica: Care Instructions Your Care Instructions Sciatica (say \"zqy-CV-iu-kuh\") is an irritation of one of the sciatic nerves, which come from the spinal cord in the lower back. The sciatic nerves and their branches extend down through the buttock to the foot. Sciatica can develop when an injured disc in the back presses against a spinal nerve root. Its main symptom is pain, numbness, or weakness that is often worse in the leg or foot than in the back. Sciatica often will improve and go away with time. Early treatment usually includes medicines and exercises to relieve pain. Follow-up care is a key part of your treatment and safety. Be sure to make and go to all appointments, and call your doctor if you are having problems. It's also a good idea to know your test results and keep a list of the medicines you take. How can you care for yourself at home? · Take pain medicines exactly as directed. ¨ If the doctor gave you a prescription medicine for pain, take it as prescribed. ¨ If you are not taking a prescription pain medicine, ask your doctor if you can take an over-the-counter medicine. · Use heat or ice to relieve pain. ¨ To apply heat, put a warm water bottle, heating pad set on low, or warm cloth on your back. Do not go to sleep with a heating pad on your skin. ¨ To use ice, put ice or a cold pack on the area for 10 to 20 minutes at a time. Put a thin cloth between the ice and your skin. · Avoid sitting if possible, unless it feels better than standing. · Alternate lying down with short walks. Increase your walking distance as you are able to without making your symptoms worse. · Do not do anything that makes your symptoms worse. When should you call for help? Call 911 anytime you think you may need emergency care.  For example, call if: 
· You are unable to move a leg at all. Call your doctor now or seek immediate medical care if: 
· You have new or worse symptoms in your legs or buttocks. Symptoms may include: ¨ Numbness or tingling. ¨ Weakness. ¨ Pain. · You lose bladder or bowel control. Watch closely for changes in your health, and be sure to contact your doctor if: 
· You are not getting better as expected. Where can you learn more? Go to http://graciela-emani.info/. Enter [phone] in the search box to learn more about \"Sciatica: Care Instructions. \" Current as of: May 23, 2016 Content Version: 11.2 © 7219-5033 [employer]. Care instructions adapted under license by durchblicker.at (which disclaims liability or warranty for this information). If you have questions about a medical condition or this instruction, always ask your healthcare professional. [place] [address] any warranty or liability for your use of this information. Ketorolac (By mouth) Ketorolac Tromethamine (yousif-toe-ROLE-ak troe-METH-a-meen) Treats severe pain. This medicine is an NSAID. Brand Name(s):Toradol There may be other brand names for this medicine. When This Medicine Should Not Be Used: You should not use this medicine if you have had an allergic reaction (including asthma) to ketorolac, aspirin, or other NSAID medicines such as Aleve®, Celebrex®, Indocin®, Motrin®, or Naprosyn®. You should not use this medicine if you have a stomach ulcer, a bleeding disorder, or if you are pregnant or breastfeeding. Do not take this medicine if you have advanced kidney disease. Do not use this medicine right before or right after having coronary artery bypass graft (CABG), a type of heart surgery. You should not take this medicine if you are using probenecid (Probalan®). How to Use This Medicine:  
Tablet · Your doctor will tell you how much medicine to use. Do not use more than directed. · Take your tablets with a full glass of water. · You may take this medicine with food or milk so it does not upset your stomach. · Use this medicine for the shortest time possible, never more than 5 days, and in the smallest dose possible. This will help lower the risk of side effects. · This medicine should come with a Medication Guide. Ask your pharmacist for a copy if you do not have one. If a dose is missed: · Take a dose as soon as you remember. If it is almost time for your next dose, wait until then and take a regular dose. Do not take extra medicine to make up for a missed dose. How to Store and Dispose of This Medicine: · Store the medicine in a closed container at room temperature, away from heat, moisture, and direct light. · Ask your pharmacist, doctor, or health caregiver about the best way to dispose of any outdated medicine or medicine no longer needed. · Keep all medicine out of the reach of children. Never share your medicine with anyone. Drugs and Foods to Avoid: Ask your doctor or pharmacist before using any other medicine, including over-the-counter medicines, vitamins, and herbal products. · Do not use any other NSAID medicine unless your doctor says it is okay. Some other NSAIDs are aspirin, diclofenac, ibuprofen, naproxen, Advil®, Aleve®, Celebrex®, Ecotrin®, Motrin®, or Voltaren®. · Make sure your doctor knows if you are also using aspirin, a blood thinner (such as warfarin, Coumadin®), or a steroid medicine (such as cortisone, dexamethasone, hydrocortisone, methylprednisolone, prednisolone, prednisone, or Orapred®). Tell your doctor if you are using methotrexate Jad Finesse), or a diuretic or \"water pill\" (such as furosemide, hydrochlorothiazide [HCTZ], torsemide, Demadex®, or Lasix®). · Make sure your doctor knows if you are also using fluoxetine, heparin, lithium, thiothixene, Eskalith®, Navane®, or Prozac®.  Tell your doctor if you are using a blood pressure medicine (such as enalapril, lisinopril, Accupril®, Lotensin®, Lotrel®, Monopril®, Prinivil®, Vasotec®, or Zestril®). Your doctor will need to know if you are using medicine to treat seizures such as phenytoin (Dilantin®) or carbamazepine (Tegretol®), or sedatives such as alprazolam (Xanax®). Warnings While Using This Medicine: · Make sure your doctor knows if you have kidney disease, liver disease, heart disease, circulation problems, untreated high blood pressure, or a history of asthma. · If you are more than 12years of age, you should not use this medicine for more than 5 days unless your doctor has told you to. · If you are 12years of age or younger, you should not use more than a single dose. · This medicine may raise your risk of having a heart attack or stroke. This is more likely in people who already have heart disease. People who use this medicine for a long time might also have a higher risk. · This medicine may cause bleeding in your stomach or intestines. These problems can happen without warning signs. This is more likely if you have had a stomach ulcer in the past, if you smoke or drink alcohol regularly, if you are over 61years old, if you are in poor health, or if you are using certain other medicines (a steroid medicine or a blood thinner). · This medicine may make you dizzy or drowsy. Avoid driving, using machines, or doing anything else that could be dangerous if you are not alert. Possible Side Effects While Using This Medicine:  
Call your doctor right away if you notice any of these side effects: · Allergic reaction: Itching or hives, swelling in your face or hands, swelling or tingling in your mouth or throat, chest tightness, trouble breathing · Blistering, peeling, or red skin rash. · Bloody or black, tarry stools. · Change in how much or how often you urinate. · Chest pain, shortness of breath, or coughing up blood. · Dark-colored urine or pale stools. · Flu-like symptoms. · Numbness or weakness in your arm or leg, or on one side of your body. · Pain in your lower leg (calf). · Problems with vision, speech, or walking. · Shortness of breath, cold sweat, and bluish-colored skin. · Skin rash or blisters with fever. · Sudden and severe stomach pain, nausea, vomiting, fever, and lightheadedness. · Sudden or severe headache. · Swelling in your hands, ankles, or feet. · Unusual bleeding or bruising. · Unusual tiredness or weakness. · Unusual weight gain. · Vomiting blood or something that looks like coffee grounds. · Yellowing of your skin or the whites of your eyes. If you notice these less serious side effects, talk with your doctor: · Changes in your vision. · Diarrhea, constipation, or indigestion. · Headache. · Mild stomach pain. · Ringing in your ears. If you notice other side effects that you think are caused by this medicine, tell your doctor. Call your doctor for medical advice about side effects. You may report side effects to FDA at 5-865-FDA-9014 © 2016 [address] is for End User's use only and may not be sold, redistributed or otherwise used for commercial purposes. The above information is an  only. It is not intended as medical advice for individual conditions or treatments. Talk to your doctor, nurse or pharmacist before following any medical regimen to see if it is safe and effective for you. Introducing [facility]! Dear Romina Wang: Thank you for requesting a [employer] account. Our records indicate that you have previously registered for a [employer] account but its currently inactive. Please call our [employer] support line at [phone]. Additional Information If you have questions, please visit the Frequently Asked Questions section of the [employer] website at [email or web address]). Fiesta [email or web address]/[email or web address] ASt/. Remember, MyChart is NOT to be used for urgent needs. For medical emergencies, dial 911. Now available from your iPhone and Android! Please provide this summary of care documentation to your next provider. Your primary care clinician is listed as Velasquez Longoria. If you have any questions after today's visit, please call [phone].

## 2018-04-09 ENCOUNTER — OFFICE VISIT (OUTPATIENT)
Dept: FAMILY MEDICINE CLINIC | Age: 63
End: 2018-04-09

## 2018-04-09 VITALS
HEIGHT: 63 IN | WEIGHT: 171 LBS | BODY MASS INDEX: 30.3 KG/M2 | DIASTOLIC BLOOD PRESSURE: 90 MMHG | SYSTOLIC BLOOD PRESSURE: 140 MMHG | OXYGEN SATURATION: 97 % | HEART RATE: 67 BPM | RESPIRATION RATE: 16 BRPM | TEMPERATURE: 98.3 F

## 2018-04-09 DIAGNOSIS — K63.5 POLYP OF COLON, UNSPECIFIED PART OF COLON, UNSPECIFIED TYPE: ICD-10-CM

## 2018-04-09 DIAGNOSIS — E55.9 VITAMIN D DEFICIENCY: ICD-10-CM

## 2018-04-09 DIAGNOSIS — G43.809 OTHER MIGRAINE WITHOUT STATUS MIGRAINOSUS, NOT INTRACTABLE: ICD-10-CM

## 2018-04-09 DIAGNOSIS — E78.5 HYPERLIPIDEMIA WITH TARGET LDL LESS THAN 130: Primary | ICD-10-CM

## 2018-04-09 DIAGNOSIS — F41.9 ANXIETY: ICD-10-CM

## 2018-04-09 DIAGNOSIS — R03.0 ELEVATED BLOOD PRESSURE READING IN OFFICE WITHOUT DIAGNOSIS OF HYPERTENSION: ICD-10-CM

## 2018-04-09 DIAGNOSIS — E66.9 OBESITY, UNSPECIFIED CLASSIFICATION, UNSPECIFIED OBESITY TYPE, UNSPECIFIED WHETHER SERIOUS COMORBIDITY PRESENT: ICD-10-CM

## 2018-04-09 DIAGNOSIS — K76.0 FATTY LIVER: ICD-10-CM

## 2018-04-09 DIAGNOSIS — Z12.39 BREAST CANCER SCREENING: ICD-10-CM

## 2018-04-09 RX ORDER — ERGOCALCIFEROL 1.25 MG/1
50000 CAPSULE ORAL
Qty: 12 CAP | Refills: 3 | Status: SHIPPED | OUTPATIENT
Start: 2018-04-09 | End: 2018-04-09 | Stop reason: SDUPTHER

## 2018-04-09 RX ORDER — ERGOCALCIFEROL 1.25 MG/1
50000 CAPSULE ORAL
Qty: 12 CAP | Refills: 3 | Status: SHIPPED | OUTPATIENT
Start: 2018-04-09 | End: 2019-04-25 | Stop reason: SDUPTHER

## 2018-04-09 RX ORDER — ATORVASTATIN CALCIUM 40 MG/1
40 TABLET, FILM COATED ORAL
Qty: 90 TAB | Refills: 3 | Status: SHIPPED | OUTPATIENT
Start: 2018-04-09 | End: 2018-04-09 | Stop reason: SDUPTHER

## 2018-04-09 RX ORDER — ATORVASTATIN CALCIUM 40 MG/1
40 TABLET, FILM COATED ORAL
Qty: 90 TAB | Refills: 3 | Status: SHIPPED | OUTPATIENT
Start: 2018-04-09 | End: 2019-04-25 | Stop reason: SDUPTHER

## 2018-04-09 RX ORDER — VENLAFAXINE HYDROCHLORIDE 75 MG/1
75 CAPSULE, EXTENDED RELEASE ORAL DAILY
Qty: 90 CAP | Refills: 3 | Status: SHIPPED | OUTPATIENT
Start: 2018-04-09 | End: 2019-04-15 | Stop reason: SDUPTHER

## 2018-04-09 RX ORDER — VENLAFAXINE HYDROCHLORIDE 75 MG/1
75 CAPSULE, EXTENDED RELEASE ORAL DAILY
Qty: 90 CAP | Refills: 3 | Status: SHIPPED | OUTPATIENT
Start: 2018-04-09 | End: 2018-04-09 | Stop reason: SDUPTHER

## 2018-04-09 NOTE — MR AVS SNAPSHOT
[address] [address] [address] [facility] 
[phone] Patient: Janice Barfield MRN: [id number] :1955 Visit Information Date & Time Provider Department Dept. Phone Encounter #  
 2018 11:00 AM Yarely De Souza, [address] [place] [address] Road [id number] Follow-up Instructions Return in about 2 weeks (around 2018) for well woman exam with PRANAV QUINONEZ [facility] . Upcoming Health Maintenance Date Due  
 PAP AKA CERVICAL CYTOLOGY 2016 ZOSTER VACCINE AGE 60> 8/15/2016 Influenza Age 5 to Adult 2017 COLONOSCOPY 2017 BREAST CANCER SCRN MAMMOGRAM 2018 DTaP/Tdap/Td series (2 - Td) 2023 Allergies as of 2018  Review Complete On: 2018 By: Yarely De Souza MD  
 No Known Allergies Current Immunizations  Reviewed on 2018 Name Date Influenza Vaccine Split 2012 Tdap 2013 Reviewed by Wesley Larios on 2018 at 10:59 AM  
You Were Diagnosed With   
  
 Codes Comments Hyperlipidemia with target LDL less than 130    -  Primary ICD-10-CM: E78.5 ICD-9-CM: 272.4 Vitamin D deficiency     ICD-10-CM: E55.9 ICD-9-CM: 268.9 Anxiety     ICD-10-CM: F41.9 ICD-9-CM: 300.00 Obesity, unspecified classification, unspecified obesity type, unspecified whether serious comorbidity present     ICD-10-CM: E66.9 ICD-9-CM: 278.00 Fatty liver     ICD-10-CM: K76.0 ICD-9-CM: 571.8 Other migraine without status migrainosus, not intractable     ICD-10-CM: O71.779 ICD-9-CM: 346.80 Polyp of colon, unspecified part of colon, unspecified type     ICD-10-CM: K63.5 ICD-9-CM: 211.3 Elevated blood pressure reading in office without diagnosis of hypertension     ICD-10-CM: R03.0 ICD-9-CM: 796.2 Breast cancer screening     ICD-10-CM: Z12.31 
ICD-9-CM: V76.10 Vitals BP Pulse Temp Resp Height(growth percentile) Weight(growth percentile) 140/90 67 98.3 °F (36.8 °C) (Oral) 16 5' 3\" (1.6 m) 171 lb (77.6 kg) SpO2 BMI OB Status Smoking Status 97% 30.29 kg/m2 Postmenopausal Never Smoker Vitals History BMI and BSA Data Body Mass Index Body Surface Area  
 30.29 kg/m 2 1.86 m 2 Preferred Pharmacy Pharmacy Name [facility] Your Updated Medication List  
  
   
This list is accurate as of 4/9/18 11:27 AM.  Always use your most recent med list.  
  
  
  
  
 atorvastatin 40 mg tablet Commonly known as:  LIPITOR Take 1 Tab by mouth nightly.  
  
 ergocalciferol 50,000 unit capsule Commonly known as:  ERGOCALCIFEROL Take 1 Cap by mouth every seven (7) days. ibuprofen 800 mg tablet Commonly known as:  MOTRIN Take 800 mg by mouth every six (6) hours as needed for Pain. MULTIVITAMIN PO Take 1 Tab by mouth daily. venlafaxine-SR 75 mg capsule Commonly known as:  EFFEXOR-XR Take 1 Cap by mouth daily. VITAMIN E PO Take 800 mg by mouth daily. ZOLMitriptan 2.5 mg tablet Commonly known as:  ZOMIG Take 1 Tab by mouth as needed for Migraine. Prescriptions Printed Refills  
 ergocalciferol (ERGOCALCIFEROL) 50,000 unit capsule 3 Sig: Take 1 Cap by mouth every seven (7) days. Class: Print Route: Oral  
 atorvastatin (LIPITOR) 40 mg tablet 3 Sig: Take 1 Tab by mouth nightly. Class: Print Route: Oral  
 venlafaxine-SR (EFFEXOR-XR) 75 mg capsule 3 Sig: Take 1 Cap by mouth daily. Class: Print Route: Oral  
  
We Performed the Following REFERRAL TO GASTROENTEROLOGY [XCO98 Custom] Comments:  
 Please evaluate patient for crcs Follow-up Instructions Return in about 2 weeks (around 4/23/2018) for well woman exam with PRANAV COREAS [facility] - [facility] . To-Do List   
 04/09/2018 Lab:  CBC W/O DIFF   
  
 04/09/2018 ECG:  EKG, 12 LEAD, INITIAL   
  
 04/09/2018 Lab: LIPID PANEL   
  
 04/09/2018 Imaging:  GIOVANY MAMMO BI SCREENING INCL CAD   
  
 04/09/2018 Lab:  METABOLIC PANEL, COMPREHENSIVE   
  
 04/09/2018 Lab:  VITAMIN D, 25 HYDROXY Referral Information Referral ID Referred By Referred To  
  
 [id number] [facility], [address] Phone: [phone] Fax: [phone] Visits Status Start Date End Date 1 New Request 4/9/18 4/9/19 If your referral has a status of pending review or denied, additional information will be sent to support the outcome of this decision. Introducing [facility]! Dear Sophia Shelton: Thank you for requesting a [employer] account. Our records indicate that you have previously registered for a [employer] account but its currently inactive. Please call our [employer] support line at [phone]. Additional Information If you have questions, please visit the Frequently Asked Questions section of the [employer] website at [email or web address] Remember, [employer] is NOT to be used for urgent needs. For medical emergencies, dial 911. Now available from your iPhone and Android! Please provide this summary of care documentation to your next provider. Your primary care clinician is listed as Ana M Sandhu. If you have any questions after today's visit, please call [phone].

## 2018-04-09 NOTE — PROGRESS NOTES
SUBJECTIVE  Chief Complaint   Patient presents with    Follow Up Chronic Condition     vit d def, hypercholesterolemia, migraines, anxiety      Follow-up for lab review for hypercholesterolemia, anxiety, fatty liver, and hypovitaminosis D. She is overdue to see Dr. Jud Kenyon for her annual by more than one year. Hypercholesterolemia - Taking Lipitor 40mg with no side effects. No myalgias or cramping reported. No chest pain or dyspnea. She is overdue to see GI for colonoscopy and her liver cystI. She was due in 9/2017 due to an 8mm polyp. The patient presents says that her anxiety is controlled on Effexor. She has some anxiety over her weight as she has gained some and her child who has ADHD. The patient denies any harmful ideation. Takes Zomig as needed for rare migraines. Says she gets these about 1 per month. OBJECTIVE    Blood pressure 140/90, pulse 67, temperature 98.3 °F (36.8 °C), temperature source Oral, resp. rate 16, height 5' 3\" (1.6 m), weight 171 lb (77.6 kg), SpO2 97 %. General:  Alert, cooperative, well appearing, in no apparent distress. Chest: clear, no w/r/r. Heart: normal s1s2, RRR, no murmurs. Ext: no swelling present. Psych: normal affect. Mood good. Oriented x 3. Judgement and insight intact. ASSESSMENT / PLAN    ICD-10-CM ICD-9-CM    1. Hyperlipidemia with target LDL less than 130 E78.5 272.4 atorvastatin (LIPITOR) 40 mg tablet      CBC W/O DIFF      METABOLIC PANEL, COMPREHENSIVE      LIPID PANEL      atorvastatin (LIPITOR) 40 mg tablet   2. Vitamin D deficiency E55.9 268.9 VITAMIN D, 25 HYDROXY   3. Anxiety F41.9 300.00 venlafaxine-SR (EFFEXOR-XR) 75 mg capsule      venlafaxine-SR (EFFEXOR-XR) 75 mg capsule   4. Obesity, unspecified classification, unspecified obesity type, unspecified whether serious comorbidity present E66.9 278.00 CBC W/O DIFF      METABOLIC PANEL, COMPREHENSIVE   5. Fatty liver K76.0 571.8    6.  Other migraine without status migrainosus, not intractable G43.809 346.80    7. Polyp of colon, unspecified part of colon, unspecified type K63.5 211.3 REFERRAL TO GASTROENTEROLOGY   8. Elevated blood pressure reading in office without diagnosis of hypertension R03.0 796.2 EKG, 12 LEAD, INITIAL   9. Breast cancer screening Z12.31 V76.10 GIOVANY MAMMO BI SCREENING INCL CAD      Hyperlipidemia - Continue Lipitor 40mg nightly. Encourage diet and exercise. Vit D def - controlled in the past on vitamin D 50,000 weekly. Check current levels. Anxiety - Continue Effexor. Obesity-  Encourage diet and exercise. Liver cyst / fatty liver / colon polyp (adenomatous) - advised on importance of GI follow-up. Overdue for CRCS. Migraines - Zomig as needed for migraines. Elevated BP - diet and exercise. If still elevated, consider EKG and low dose BP pill at CPE and then I can see her back in a few weeks. Advised on importance of CPE / well woman exam.  She will schedule with Ibeth Vincent. Advised on seeing a dermatologist and provided contact info due to her family history of skin ca in her father. All chart history elements were reviewed by me at the time of the visit even though marked at time of note closure. Patient understands our medical plan. Patient has provided input and agrees with goals. Alternatives have been explained and offered. All questions answered. The patient is to call if condition worsens or fails to improve. Follow-up Disposition:  Return in about 2 weeks (around 4/23/2018) for well woman exam with Ibeth Vincent .

## 2018-04-09 NOTE — PROGRESS NOTES
1. Have you been to the ER, urgent care clinic since your last visit? Hospitalized since your last visit? No    2. Have you seen or consulted any other health care providers outside of the [address] [place] [address] since your last visit? Include any pap smears or colon screening. No     3.  Shirley Christy declined yearly influenza vaccine and last Pap smear in 2013    Health Maintenance Due   Topic Date Due    PAP AKA CERVICAL CYTOLOGY  08/02/2016    ZOSTER VACCINE AGE 60>  08/15/2016    Influenza Age 9 to Adult  08/01/2017    COLONOSCOPY  09/12/2017    BREAST CANCER SCRN MAMMOGRAM  01/28/2018

## 2018-04-12 ENCOUNTER — HOSPITAL ENCOUNTER (OUTPATIENT)
Dept: LAB | Age: 63
Discharge: HOME | End: 2018-04-12
Payer: OTHER GOVERNMENT

## 2018-04-12 DIAGNOSIS — E55.9 VITAMIN D DEFICIENCY: ICD-10-CM

## 2018-04-12 DIAGNOSIS — E78.5 HYPERLIPIDEMIA WITH TARGET LDL LESS THAN 130: ICD-10-CM

## 2018-04-12 DIAGNOSIS — R03.0 ELEVATED BLOOD PRESSURE READING IN OFFICE WITHOUT DIAGNOSIS OF HYPERTENSION: ICD-10-CM

## 2018-04-12 DIAGNOSIS — E66.9 OBESITY, UNSPECIFIED CLASSIFICATION, UNSPECIFIED OBESITY TYPE, UNSPECIFIED WHETHER SERIOUS COMORBIDITY PRESENT: ICD-10-CM

## 2018-04-12 LAB
25(OH)D3 SERPL-MCNC: 38.3 NG/ML (ref 30–100)
ALBUMIN SERPL-MCNC: 4.1 G/DL (ref 3.4–5)
ALBUMIN/GLOB SERPL: 1.1 {RATIO} (ref 0.8–1.7)
ALP SERPL-CCNC: 81 U/L (ref 45–117)
ALT SERPL-CCNC: 72 U/L (ref 13–56)
ANION GAP SERPL CALC-SCNC: 8 MMOL/L (ref 3–18)
AST SERPL-CCNC: 41 U/L (ref 15–37)
ATRIAL RATE: 73 BPM
BILIRUB SERPL-MCNC: 0.7 MG/DL (ref 0.2–1)
BUN SERPL-MCNC: 13 MG/DL (ref 7–18)
BUN/CREAT SERPL: 17 (ref 12–20)
CALCIUM SERPL-MCNC: 9.2 MG/DL (ref 8.5–10.1)
CALCULATED P AXIS, ECG09: 58 DEGREES
CALCULATED R AXIS, ECG10: 9 DEGREES
CALCULATED T AXIS, ECG11: 53 DEGREES
CHLORIDE SERPL-SCNC: 102 MMOL/L (ref 100–108)
CHOLEST SERPL-MCNC: 208 MG/DL
CO2 SERPL-SCNC: 31 MMOL/L (ref 21–32)
CREAT SERPL-MCNC: 0.77 MG/DL (ref 0.6–1.3)
DIAGNOSIS, 93000: NORMAL
ERYTHROCYTE [DISTWIDTH] IN BLOOD BY AUTOMATED COUNT: 13.7 % (ref 11.6–14.5)
GLOBULIN SER CALC-MCNC: 3.7 G/DL (ref 2–4)
GLUCOSE SERPL-MCNC: 101 MG/DL (ref 74–99)
HCT VFR BLD AUTO: 42.7 % (ref 35–45)
HDLC SERPL-MCNC: 45 MG/DL (ref 40–60)
HDLC SERPL: 4.6 {RATIO} (ref 0–5)
HGB BLD-MCNC: 14.2 G/DL (ref 12–16)
LDLC SERPL CALC-MCNC: 129.8 MG/DL (ref 0–100)
LIPID PROFILE,FLP: ABNORMAL
MCH RBC QN AUTO: 28.5 PG (ref 24–34)
MCHC RBC AUTO-ENTMCNC: 33.3 G/DL (ref 31–37)
MCV RBC AUTO: 85.6 FL (ref 74–97)
P-R INTERVAL, ECG05: 210 MS
PLATELET # BLD AUTO: 309 K/UL (ref 135–420)
PMV BLD AUTO: 10.9 FL (ref 9.2–11.8)
POTASSIUM SERPL-SCNC: 4.2 MMOL/L (ref 3.5–5.5)
PROT SERPL-MCNC: 7.8 G/DL (ref 6.4–8.2)
Q-T INTERVAL, ECG07: 384 MS
QRS DURATION, ECG06: 78 MS
QTC CALCULATION (BEZET), ECG08: 423 MS
RBC # BLD AUTO: 4.99 M/UL (ref 4.2–5.3)
SODIUM SERPL-SCNC: 141 MMOL/L (ref 136–145)
TRIGL SERPL-MCNC: 166 MG/DL (ref ?–150)
VENTRICULAR RATE, ECG03: 73 BPM
VLDLC SERPL CALC-MCNC: 33.2 MG/DL
WBC # BLD AUTO: 8.1 K/UL (ref 4.6–13.2)

## 2018-04-12 PROCEDURE — 85027 COMPLETE CBC AUTOMATED: CPT | Performed by: FAMILY MEDICINE

## 2018-04-12 PROCEDURE — 82306 VITAMIN D 25 HYDROXY: CPT | Performed by: FAMILY MEDICINE

## 2018-04-12 PROCEDURE — 36415 COLL VENOUS BLD VENIPUNCTURE: CPT | Performed by: FAMILY MEDICINE

## 2018-04-12 PROCEDURE — 80061 LIPID PANEL: CPT | Performed by: FAMILY MEDICINE

## 2018-04-12 PROCEDURE — 80053 COMPREHEN METABOLIC PANEL: CPT | Performed by: FAMILY MEDICINE

## 2018-04-12 PROCEDURE — 93005 ELECTROCARDIOGRAM TRACING: CPT

## 2018-04-17 ENCOUNTER — HOSPITAL ENCOUNTER (OUTPATIENT)
Dept: MAMMOGRAPHY | Age: 63
Discharge: HOME | End: 2018-04-17
Attending: FAMILY MEDICINE
Payer: OTHER GOVERNMENT

## 2018-04-17 DIAGNOSIS — Z12.39 BREAST CANCER SCREENING: ICD-10-CM

## 2018-04-17 PROCEDURE — 77063 BREAST TOMOSYNTHESIS BI: CPT

## 2018-04-24 ENCOUNTER — HOSPITAL ENCOUNTER (OUTPATIENT)
Dept: LAB | Age: 63
Discharge: HOME | End: 2018-04-24
Payer: OTHER GOVERNMENT

## 2018-04-24 ENCOUNTER — OFFICE VISIT (OUTPATIENT)
Dept: FAMILY MEDICINE CLINIC | Age: 63
End: 2018-04-24

## 2018-04-24 VITALS
DIASTOLIC BLOOD PRESSURE: 68 MMHG | HEART RATE: 97 BPM | RESPIRATION RATE: 12 BRPM | BODY MASS INDEX: 29.84 KG/M2 | TEMPERATURE: 98.8 F | WEIGHT: 168.4 LBS | HEIGHT: 63 IN | OXYGEN SATURATION: 93 % | SYSTOLIC BLOOD PRESSURE: 128 MMHG

## 2018-04-24 DIAGNOSIS — M85.80 OSTEOPENIA, UNSPECIFIED LOCATION: ICD-10-CM

## 2018-04-24 DIAGNOSIS — E78.5 HYPERLIPIDEMIA WITH TARGET LDL LESS THAN 130: ICD-10-CM

## 2018-04-24 DIAGNOSIS — R03.0 ELEVATED BP WITHOUT DIAGNOSIS OF HYPERTENSION: ICD-10-CM

## 2018-04-24 DIAGNOSIS — Z12.4 SCREENING FOR CERVICAL CANCER: ICD-10-CM

## 2018-04-24 DIAGNOSIS — K76.0 FATTY LIVER: ICD-10-CM

## 2018-04-24 DIAGNOSIS — B37.2 CANDIDAL DERMATITIS: ICD-10-CM

## 2018-04-24 DIAGNOSIS — Z01.419 WELL WOMAN EXAM WITH ROUTINE GYNECOLOGICAL EXAM: Primary | ICD-10-CM

## 2018-04-24 DIAGNOSIS — I44.0 FIRST DEGREE AV BLOCK: ICD-10-CM

## 2018-04-24 PROCEDURE — 88142 CYTOPATH C/V THIN LAYER: CPT | Performed by: PHYSICIAN ASSISTANT

## 2018-04-24 RX ORDER — NYSTATIN 100000 [USP'U]/G
POWDER TOPICAL
Qty: 15 G | Refills: 1 | Status: SHIPPED | OUTPATIENT
Start: 2018-04-24 | End: 2019-04-25

## 2018-04-24 NOTE — PATIENT INSTRUCTIONS

## 2018-04-24 NOTE — PROGRESS NOTES
Chief Complaint   Patient presents with    Well Woman     Visit Vitals    /68 (BP 1 Location: Right arm, BP Patient Position: Sitting)    Pulse 97    Temp 98.8 °F (37.1 °C) (Oral)    Resp 12    Ht 5' 3\" (1.6 m)    Wt 168 lb 6.4 oz (76.4 kg)    SpO2 93%    BMI 29.83 kg/m2       Patient is not fasting. Patient in room # 6.     1. Have you been to the ER, urgent care clinic since your last visit? Hospitalized since your last visit? No    2. Have you seen or consulted any other health care providers outside of the [facility] since your last visit? Include any pap smears or colon screening. No    HM Reviewed. Flowsheet Abuse completed.

## 2018-04-24 NOTE — MR AVS SNAPSHOT
[address] [place] [address] 
[phone] Patient: Francisco J Ferguson MRN: [id number] :1955 Visit Information Date & Time Provider Department Dept. Phone Encounter #  
 2018 10:30 AM Danyelle Marques, [address] [place] [address] Road [id number] Follow-up Instructions Return in about 1 year (around 2019) for Norma Lazo 39. Upcoming Health Maintenance Date Due  
 PAP AKA CERVICAL CYTOLOGY 2016 ZOSTER VACCINE AGE 60> 8/15/2016 Influenza Age 5 to Adult 2017 COLONOSCOPY 2017 BREAST CANCER SCRN MAMMOGRAM 2020 DTaP/Tdap/Td series (2 - Td) 2023 Allergies as of 2018  Review Complete On: 2018 By: TONI Adamson No Known Allergies Current Immunizations  Reviewed on 2018 Name Date Influenza Vaccine Split 2012 Tdap 2013 Not reviewed this visit You Were Diagnosed With   
  
 Codes Comments Well woman exam with routine gynecological exam    -  Primary ICD-10-CM: T38.707 ICD-9-CM: V72.31 Screening for cervical cancer     ICD-10-CM: Z12.4 ICD-9-CM: V76.2 Elevated BP without diagnosis of hypertension     ICD-10-CM: R03.0 ICD-9-CM: 796.2 First degree AV block     ICD-10-CM: I44.0 ICD-9-CM: 426.11 Fatty liver     ICD-10-CM: K76.0 ICD-9-CM: 571.8 Osteopenia, unspecified location     ICD-10-CM: M85.80 ICD-9-CM: 733.90 Candidal dermatitis     ICD-10-CM: B37.2 ICD-9-CM: 112.3 Vitals BP Pulse Temp Resp Height(growth percentile) Weight(growth percentile) 128/68 (BP 1 Location: Right arm, BP Patient Position: Sitting) 97 98.8 °F (37.1 °C) (Oral) 12 5' 3\" (1.6 m) 168 lb 6.4 oz (76.4 kg) SpO2 BMI OB Status Smoking Status 93% 29.83 kg/m2 Postmenopausal Never Smoker Vitals History BMI and BSA Data Body Mass Index Body Surface Area 29.83 kg/m 2 1.84 m 2 Preferred Pharmacy Pharmacy Name Phone Robert Enriquez, [facility] [phone] Your Updated Medication List  
  
   
This list is accurate as of 4/24/18 11:07 AM.  Always use your most recent med list.  
  
  
  
  
 atorvastatin 40 mg tablet Commonly known as:  LIPITOR Take 1 Tab by mouth nightly.  
  
 ergocalciferol 50,000 unit capsule Commonly known as:  ERGOCALCIFEROL Take 1 Cap by mouth every seven (7) days. ibuprofen 800 mg tablet Commonly known as:  MOTRIN Take 800 mg by mouth every six (6) hours as needed for Pain. MULTIVITAMIN PO Take 1 Tab by mouth daily. nystatin powder Commonly known as:  MYCOSTATIN Apply  to affected area four (4) times daily as needed. venlafaxine-SR 75 mg capsule Commonly known as:  EFFEXOR-XR Take 1 Cap by mouth daily. VITAMIN E PO Take 800 mg by mouth daily. ZOLMitriptan 2.5 mg tablet Commonly known as:  ZOMIG Take 1 Tab by mouth as needed for Migraine. Prescriptions Sent to Pharmacy Refills  
 nystatin (MYCOSTATIN) powder 1 Sig: Apply  to affected area four (4) times daily as needed. Class: Normal  
 Pharmacy: [address] [place] [address], [address] [place] [address] #: [phone] Route: Topical  
  
Follow-up Instructions Return in about 1 year (around 4/24/2019) for Norma Pettit. To-Do List   
 04/24/2018 Pathology:  PAP, LB, RFX HPV [facility]([id number]) Around 04/25/2018 Imaging:  DEXA BONE DENSITY STUDY AXIAL Patient Instructions A Healthy Lifestyle: Care Instructions Your Care Instructions A healthy lifestyle can help you feel good, stay at a healthy weight, and have plenty of energy for both work and play. A healthy lifestyle is something you can share with your whole family. A healthy lifestyle also can lower your risk for serious health problems, such as high blood pressure, heart disease, and diabetes. You can follow a few steps listed below to improve your health and the health of your family. Follow-up care is a key part of your treatment and safety. Be sure to make and go to all appointments, and call your doctor if you are having problems. It's also a good idea to know your test results and keep a list of the medicines you take. How can you care for yourself at home? · Do not eat too much sugar, fat, or fast foods. You can still have dessert and treats now and then. The goal is moderation. · Start small to improve your eating habits. Pay attention to portion sizes, drink less juice and soda pop, and eat more fruits and vegetables. ¨ Eat a healthy amount of food. A 3-ounce serving of meat, for example, is about the size of a deck of cards. Fill the rest of your plate with vegetables and whole grains. ¨ Limit the amount of soda and sports drinks you have every day. Drink more water when you are thirsty. ¨ Eat at least 5 servings of fruits and vegetables every day. It may seem like a lot, but it is not hard to reach this goal. A serving or helping is 1 piece of fruit, 1 cup of vegetables, or 2 cups of leafy, raw vegetables. Have an apple or some carrot sticks as an afternoon snack instead of a candy bar. Try to have fruits and/or vegetables at every meal. 
· Make exercise part of your daily routine. You may want to start with simple activities, such as walking, bicycling, or slow swimming. Try to be active 30 to 60 minutes every day. You do not need to do all 30 to 60 minutes all at once. For example, you can exercise 3 times a day for 10 or 20 minutes. Moderate exercise is safe for most people, but it is always a good idea to talk to your doctor before starting an exercise program. 
· Keep moving. Mahesh Reeves the lawn, work in the garden, or [employer].  Take the stairs instead of the elevator at work. · If you smoke, quit. People who smoke have an increased risk for heart attack, stroke, cancer, and other lung illnesses. Quitting is hard, but there are ways to boost your chance of quitting tobacco for good. ¨ Use nicotine gum, patches, or lozenges. ¨ Ask your doctor about stop-smoking programs and medicines. ¨ Keep trying. In addition to reducing your risk of diseases in the future, you will notice some benefits soon after you stop using tobacco. If you have shortness of breath or asthma symptoms, they will likely get better within a few weeks after you quit. · Limit how much alcohol you drink. Moderate amounts of alcohol (up to 2 drinks a day for men, 1 drink a day for women) are okay. But drinking too much can lead to liver problems, high blood pressure, and other health problems. Family health If you have a family, there are many things you can do together to improve your health. · Eat meals together as a family as often as possible. · Eat healthy foods. This includes fruits, vegetables, lean meats and dairy, and whole grains. · Include your family in your fitness plan. Most people think of activities such as jogging or tennis as the way to fitness, but there are many ways you and your family can be more active. Anything that makes you breathe hard and gets your heart pumping is exercise. Here are some tips: 
¨ Walk to do errands or to take your child to school or the bus. ¨ Go for a family bike ride after dinner instead of watching TV. Where can you learn more? Go to http://graciela-emani.info/. Enter G390 in the search box to learn more about \"A Healthy Lifestyle: Care Instructions. \" Current as of: May 12, 2017 Content Version: 11.4 © 8488-1090 [employer].  Care instructions adapted under license by [employer] (which disclaims liability or warranty for this information). If you have questions about a medical condition or this instruction, always ask your healthcare professional. [address] any warranty or liability for your use of this information. Introducing \[facility]\"" SERVICES! Stefanie Cardona introduces Spiced Bits patient portal. Now you can access parts of your medical record, email your doctor's office, and request medication refills online. 1. In your internet browser, go to [email or web address] 2. Click on the First Time User? Click Here link in the Sign In box. You will see the New Member Sign Up page. 3. Enter your Spiced Bits Access Code exactly as it appears below. You will not need to use this code after youve completed the sign-up process. If you do not sign up before the expiration date, you must request a new code. · Spiced Bits Access Code: [email or web address] Expires: 7/8/2018 11:30 AM 
 
4. Enter the last four digits of your Social Security Number (xxxx) and Date of Birth (mm/dd/yyyy) as indicated and click Submit. You will be taken to the next sign-up page. 5. Create a Spiced Bits ID. This will be your Spiced Bits login ID and cannot be changed, so think of one that is secure and easy to remember. 6. Create a Spiced Bits password. You can change your password at any time. 7. Enter your Password Reset Question and Answer. This can be used at a later time if you forget your password. 8. Enter your e-mail address. You will receive e-mail notification when new information is available in [address] 9. Click Sign Up. You can now view and download portions of your medical record. 10. Click the Download Summary menu link to download a portable copy of your medical information. If you have questions, please visit the Frequently Asked Questions section of the Spiced Bits website. Remember, Spiced Bits is NOT to be used for urgent needs. For medical emergencies, dial 911. Now available from your iPhone and Android! Please provide this summary of care documentation to your next provider. Your primary care clinician is listed as Geovany Lowe. If you have any questions after today's visit, please call [phone].

## 2018-04-24 NOTE — PROGRESS NOTES
Patient: Halima Mccarthy MRN: [id number]  SSN: [id number]    YOB: 1955  Age: 65 y.o. Sex: female        Date of Service: 4/24/2018   Provider: TONI Bermudez   Office Location:   Office Location:   [address] [place], [address]  Office Phone: [phone]  Office Fax: [phone]      REASON FOR VISIT:   Chief Complaint   Patient presents with    Well Woman       VITALS:   Visit Vitals    /68 (BP 1 Location: Right arm, BP Patient Position: Sitting)  Comment: manual    Pulse 97    Temp 98.8 °F (37.1 °C) (Oral)    Resp 12    Ht 5' 3\" (1.6 m)    Wt 168 lb 6.4 oz (76.4 kg)    SpO2 93%    BMI 29.83 kg/m2       MEDICATIONS:   Current Outpatient Prescriptions on File Prior to Visit   Medication Sig Dispense Refill    ergocalciferol (ERGOCALCIFEROL) 50,000 unit capsule Take 1 Cap by mouth every seven (7) days. 12 Cap 3    atorvastatin (LIPITOR) 40 mg tablet Take 1 Tab by mouth nightly. 90 Tab 3    venlafaxine-SR (EFFEXOR-XR) 75 mg capsule Take 1 Cap by mouth daily. 90 Cap 3    ibuprofen (MOTRIN) 800 mg tablet Take 800 mg by mouth every six (6) hours as needed for Pain.  ZOLMitriptan (ZOMIG) 2.5 mg tablet Take 1 Tab by mouth as needed for Migraine. 18 Tab 3    MULTIVITAMIN PO Take 1 Tab by mouth daily.  VITAMIN E ACETATE (VITAMIN E PO) Take 800 mg by mouth daily. No current facility-administered medications on file prior to visit.          ALLERGIES:   No Known Allergies     PAST MEDICAL HISTORY:  Past Medical History:   Diagnosis Date    Elevated blood pressure reading without diagnosis of hypertension     Fatty liver     sees GI    Fibroadenoma of right breast 8/2013    rpt US 3/14    First degree AV block 4/24/2018    Generalized anxiety disorder     High cholesterol     Hx of colonoscopy 09/12/2012    Dr. Tyler Simmons, due 9/2017, 8mm polyp    Liver cyst     sees GI    Migraine without aura, without mention of intractable migraine without mention of status migrainosus     Osteopenia     Other and unspecified hyperlipidemia         SURGICAL HISTORY:  History reviewed. No pertinent surgical history. FAMILY HISTORY:  Family History   Problem Relation Age of Onset    Hypertension Father     High Cholesterol Father     Cancer Mother      brain    Osteoporosis Mother     Stroke Mother     Other Mother      skin ca - basal cell    Diabetes Paternal Grandfather     Heart Attack Paternal Grandfather     Stroke Paternal Grandmother         SOCIAL HISTORY:  Social History     Social History    Marital status:      Spouse name: N/A    Number of children: N/A    Years of education: N/A     Occupational History    stay at home mom      Social History Main Topics    Smoking status: Never Smoker    Smokeless tobacco: Never Used    Alcohol use 1.0 oz/week     2 Glasses of wine per week      Comment: occ    Drug use: No    Sexual activity: Yes     Partners: Male     Other Topics Concern    None     Social History Narrative        MENSTRUAL HISTORY:  Age at menarche: age 16   LMP: about 8 years ago    SEXUAL HISTORY:  Sexual activity: Patient is not currently sexually active, but does have a male partner  Contraceptive method: N/A  History of STIs: denies     HEALTH SCREENING HISTORY:  Last pap: 8/2/13  Results: normal  History of abnormal pap?: none     Last mammogram: 4/17/18, bi-rads 1  Last DEXA scan: 8/19/13, osteopenia  Last colorectal screening: colonoscopy 9/12/12, follow up recommended in 5 years. Patient is overdue. She does state she has been working on scheduling a follow up visit with her gastroenterologist.       HPI:  Jessika Chopra is a 65 y.o. female who presents to the office for her annual well woman exam. She has no acute complaints or concerns today. REVIEW OF SYSTEMS:   Review of Systems   Constitutional: Negative for chills, fever and malaise/fatigue.    Respiratory: Negative for cough, shortness of breath and wheezing. Cardiovascular: Negative for chest pain, palpitations, orthopnea and leg swelling. Gastrointestinal: Negative for abdominal pain, blood in stool, constipation, diarrhea, nausea and vomiting. Neurological: Negative for dizziness and headaches. Breasts: Denies masses, skin changes, nipple discharge  Genitourinary: Denies pelvic pain, dyspareunia, abnormal vaginal bleeding or discharge, urinary frequency, urgency, dysuria, hematuria. PHYSICAL EXAMINATION:   Physical Exam   Constitutional: She is oriented to person, place, and time and well-developed, well-nourished, and in no distress. HENT:   Head: Normocephalic and atraumatic. Right Ear: External ear normal.   Left Ear: External ear normal.   Nose: Nose normal.   Mouth/Throat: Oropharynx is clear and moist.   Eyes: Conjunctivae are normal. Pupils are equal, round, and reactive to light. Right eye exhibits no discharge. Left eye exhibits no discharge. Neck: Neck supple. No thyromegaly present. Cardiovascular: Normal rate, regular rhythm and normal heart sounds. Exam reveals no gallop and no friction rub. No murmur heard. Pulmonary/Chest: Effort normal and breath sounds normal. No stridor. She has no wheezes. She has no rales. Abdominal: Soft. Bowel sounds are normal. She exhibits no distension. There is no tenderness. Lymphadenopathy:     She has no cervical adenopathy. Neurological: She is alert and oriented to person, place, and time. Gait normal.   Skin: Skin is warm and dry. Rash ( confluent erythematous patches under b/l breasts and in groin b/l) noted. Psychiatric: Mood, memory and affect normal.   Breasts: Symmetric bilaterally without skin or nipple changes, tenderness, or masses  Pelvic: External genitalia - no erythema, rashes, or lesions. Internal - vagina pink rugae without lesions or discharge. Cervix - pink, non-friable, os patent with no discharge.  No cervical motion tenderness. Bimanual - Uterus and adnexae non-tender. No masses palpated. RESULTS:  Lab Results   Component Value Date/Time    WBC 8.1 04/12/2018 09:17 AM    HGB 14.2 04/12/2018 09:17 AM    HCT 42.7 04/12/2018 09:17 AM    PLATELET 922 36/89/4371 09:17 AM    MCV 85.6 04/12/2018 09:17 AM      Lab Results   Component Value Date/Time    Sodium 141 04/12/2018 09:17 AM    Potassium 4.2 04/12/2018 09:17 AM    Chloride 102 04/12/2018 09:17 AM    CO2 31 04/12/2018 09:17 AM    Anion gap 8 04/12/2018 09:17 AM    Glucose 101 (H) 04/12/2018 09:17 AM    BUN 13 04/12/2018 09:17 AM    Creatinine 0.77 04/12/2018 09:17 AM    BUN/Creatinine ratio 17 04/12/2018 09:17 AM    GFR est AA >60 04/12/2018 09:17 AM    GFR est non-AA >60 04/12/2018 09:17 AM    Calcium 9.2 04/12/2018 09:17 AM    Bilirubin, total 0.7 04/12/2018 09:17 AM    AST (SGOT) 41 (H) 04/12/2018 09:17 AM    Alk.  phosphatase 81 04/12/2018 09:17 AM    Protein, total 7.8 04/12/2018 09:17 AM    Albumin 4.1 04/12/2018 09:17 AM    Globulin 3.7 04/12/2018 09:17 AM    A-G Ratio 1.1 04/12/2018 09:17 AM    ALT (SGPT) 72 (H) 04/12/2018 09:17 AM      Lab Results   Component Value Date/Time    Cholesterol, total 208 (H) 04/12/2018 09:17 AM    HDL Cholesterol 45 04/12/2018 09:17 AM    LDL, calculated 129.8 (H) 04/12/2018 09:17 AM    VLDL, calculated 33.2 04/12/2018 09:17 AM    Triglyceride 166 (H) 04/12/2018 09:17 AM    CHOL/HDL Ratio 4.6 04/12/2018 09:17 AM      Lab Results   Component Value Date/Time    Vitamin D 25-Hydroxy 38.3 04/12/2018 09:17 AM        EKG 4/12/18  Component Value Ref Range & Units Status   Ventricular Rate 73 BPM Final   Atrial Rate 73 BPM Final   P-R Interval 210 ms Final   QRS Duration 78 ms Final   Q-T Interval 384 ms Final   QTC Calculation (Bezet) 423 ms Final   Calculated P Axis 58 degrees Final   Calculated R Axis 9 degrees Final   Calculated T Axis 53 degrees Final   Diagnosis   Final   Sinus rhythm with 1st degree AV block   Possible Left atrial enlargement   Borderline ECG   No previous ECGs available   Confirmed by Chris Neumann MD, Jesika Early ([id number]) on 4/12/2018 1:05:38 PM          ASSESSMENT/PLAN:  Diagnoses and all orders for this visit:    1. Well woman exam with routine gynecological exam  - Essentially normal physical exam findings as detailed above  - Encouraged healthy habits, weight loss through diet and exercise    2. Screening for cervical cancer  - Pap smear done today  - If normal, will repeat in 3 years at which point patient will be 60years old and will likely not need further screening beyond that unless there is a concern  Orders:    -     PAP, LB, RFX HPV [facility]QEX([id number]); Future    3. Elevated BP without diagnosis of hypertension  - BP improved today  - Will continue to monitor  - Patient is actively working on losing some weight, which will likely help with blood pressure management     4. First degree AV block  - Incidental finding on recent screening EKG   - Discussed what this means, and offered optional referral to cardiologist for further discussion. Patient prefers to hold off on this for now as she is completely asymptomatic. Advised her she may call back or return to the office at any time if she changes her mind. - We can continue to monitor from a primary care setting     5. Fatty liver  - Advised of slight increase in LFTs   - Working on scheduling follow up with GI     6. Hyperlipidemia with target LDL less than 130  - Stable  - Continue statin therapy  - Encouraged weight loss through diet and exercise     7. Osteopenia, unspecified location  - Osteopenia noted on DEXA from 2013. Due for repeat. - Encouraged adequate dietary calcium, vitamin D, weight bearing exercise  Orders:    -     DEXA BONE DENSITY STUDY AXIAL; Future    8. Candidal dermatitis  - Noted under breasts and in groin  - Nystatin powder to use as needed  Orders:    -     nystatin (MYCOSTATIN) powder;  Apply  to affected area four (4) times daily as needed. Follow-up Disposition:  Return in about 1 year (around 4/24/2019) for UlMark Lazo 39.      TONI Santos  4/24/2018   1:05 PM

## 2018-05-02 ENCOUNTER — DOCUMENTATION ONLY (OUTPATIENT)
Dept: FAMILY MEDICINE CLINIC | Age: 63
End: 2018-05-02

## 2018-05-02 NOTE — PROGRESS NOTES
Attempted to submit a gastroenterology referral on patient's behalf. Per [facility] this patient is not enrolled in [facility] so no referral is required for the requested services.

## 2018-07-12 ENCOUNTER — PATIENT MESSAGE (OUTPATIENT)
Dept: FAMILY MEDICINE CLINIC | Age: 63
End: 2018-07-12

## 2018-07-12 RX ORDER — VENLAFAXINE HYDROCHLORIDE 37.5 MG/1
37.5 CAPSULE, EXTENDED RELEASE ORAL DAILY
Qty: 30 CAP | Refills: 1 | Status: SHIPPED | OUTPATIENT
Start: 2018-07-12 | End: 2020-08-18

## 2018-07-12 NOTE — TELEPHONE ENCOUNTER
From: Tariq Fitzgerald  To: Adonay Najera MD  Sent: 7/12/2018 1:32 PM EDT  Subject: Prescription Question    I am having some increased anxiety and was wondering if I could get my Effexor increased? I think we had added a 37.5 mg at one time. I have been having some stress and unfortunately am making myself anxious.     Thank you,  Akin Cavazos

## 2019-04-10 ENCOUNTER — TELEPHONE (OUTPATIENT)
Dept: FAMILY MEDICINE CLINIC | Age: 64
End: 2019-04-10

## 2019-04-10 DIAGNOSIS — K76.0 FATTY LIVER: Primary | ICD-10-CM

## 2019-04-10 DIAGNOSIS — E78.5 HYPERLIPIDEMIA WITH TARGET LDL LESS THAN 130: ICD-10-CM

## 2019-04-10 DIAGNOSIS — M85.80 OSTEOPENIA, UNSPECIFIED LOCATION: ICD-10-CM

## 2019-04-10 NOTE — TELEPHONE ENCOUNTER
Pt will be coming in for an appt on 4/25/2019 and would like to know if she needs lab work done. She will be have her cpe . Please advise.

## 2019-04-15 DIAGNOSIS — F41.9 ANXIETY: ICD-10-CM

## 2019-04-15 RX ORDER — VENLAFAXINE HYDROCHLORIDE 75 MG/1
75 CAPSULE, EXTENDED RELEASE ORAL DAILY
Qty: 90 CAP | Refills: 3 | Status: SHIPPED | OUTPATIENT
Start: 2019-04-15 | End: 2020-03-31 | Stop reason: SDUPTHER

## 2019-04-15 NOTE — TELEPHONE ENCOUNTER
This patient contacted office for the following prescriptions to be filled:    Medication requested :   Requested Prescriptions     Pending Prescriptions Disp Refills    venlafaxine-SR (EFFEXOR-XR) 75 mg capsule 90 Cap 3     Sig: Take 1 Cap by mouth daily.      PCP: [id number] or Print: pharmacy  Mail order or Local pharmacy: [facility]  Last OV: 4/24/18  Last labs: 4/12/18  Next OV and labs: 4/25/19 with labs due prior

## 2019-04-18 ENCOUNTER — HOSPITAL ENCOUNTER (OUTPATIENT)
Dept: LAB | Age: 64
Discharge: HOME | End: 2019-04-18
Payer: OTHER GOVERNMENT

## 2019-04-18 DIAGNOSIS — K76.0 FATTY LIVER: ICD-10-CM

## 2019-04-18 DIAGNOSIS — E78.5 HYPERLIPIDEMIA WITH TARGET LDL LESS THAN 130: ICD-10-CM

## 2019-04-18 DIAGNOSIS — M85.80 OSTEOPENIA, UNSPECIFIED LOCATION: ICD-10-CM

## 2019-04-18 LAB
25(OH)D3 SERPL-MCNC: 52.3 NG/ML (ref 30–100)
ALBUMIN SERPL-MCNC: 3.9 G/DL (ref 3.4–5)
ALBUMIN/GLOB SERPL: 1.1 {RATIO} (ref 0.8–1.7)
ALP SERPL-CCNC: 90 U/L (ref 45–117)
ALT SERPL-CCNC: 54 U/L (ref 13–56)
ANION GAP SERPL CALC-SCNC: 3 MMOL/L (ref 3–18)
AST SERPL-CCNC: 24 U/L (ref 15–37)
BILIRUB SERPL-MCNC: 0.6 MG/DL (ref 0.2–1)
BUN SERPL-MCNC: 14 MG/DL (ref 7–18)
BUN/CREAT SERPL: 17 (ref 12–20)
CALCIUM SERPL-MCNC: 9.4 MG/DL (ref 8.5–10.1)
CHLORIDE SERPL-SCNC: 107 MMOL/L (ref 100–108)
CHOLEST SERPL-MCNC: 200 MG/DL
CO2 SERPL-SCNC: 30 MMOL/L (ref 21–32)
CREAT SERPL-MCNC: 0.82 MG/DL (ref 0.6–1.3)
ERYTHROCYTE [DISTWIDTH] IN BLOOD BY AUTOMATED COUNT: 13.5 % (ref 11.6–14.5)
GLOBULIN SER CALC-MCNC: 3.5 G/DL (ref 2–4)
GLUCOSE SERPL-MCNC: 103 MG/DL (ref 74–99)
HCT VFR BLD AUTO: 42.3 % (ref 35–45)
HDLC SERPL-MCNC: 50 MG/DL (ref 40–60)
HDLC SERPL: 4 {RATIO} (ref 0–5)
HGB BLD-MCNC: 13.9 G/DL (ref 12–16)
LDLC SERPL CALC-MCNC: 112.6 MG/DL (ref 0–100)
LIPID PROFILE,FLP: ABNORMAL
MCH RBC QN AUTO: 28.3 PG (ref 24–34)
MCHC RBC AUTO-ENTMCNC: 32.9 G/DL (ref 31–37)
MCV RBC AUTO: 86 FL (ref 74–97)
PLATELET # BLD AUTO: 307 K/UL (ref 135–420)
PMV BLD AUTO: 11 FL (ref 9.2–11.8)
POTASSIUM SERPL-SCNC: 4.4 MMOL/L (ref 3.5–5.5)
PROT SERPL-MCNC: 7.4 G/DL (ref 6.4–8.2)
RBC # BLD AUTO: 4.92 M/UL (ref 4.2–5.3)
SODIUM SERPL-SCNC: 140 MMOL/L (ref 136–145)
TRIGL SERPL-MCNC: 187 MG/DL (ref ?–150)
VLDLC SERPL CALC-MCNC: 37.4 MG/DL
WBC # BLD AUTO: 7.7 K/UL (ref 4.6–13.2)

## 2019-04-18 PROCEDURE — 80061 LIPID PANEL: CPT

## 2019-04-18 PROCEDURE — 36415 COLL VENOUS BLD VENIPUNCTURE: CPT

## 2019-04-18 PROCEDURE — 80053 COMPREHEN METABOLIC PANEL: CPT

## 2019-04-18 PROCEDURE — 85027 COMPLETE CBC AUTOMATED: CPT

## 2019-04-18 PROCEDURE — 82306 VITAMIN D 25 HYDROXY: CPT

## 2019-04-25 ENCOUNTER — OFFICE VISIT (OUTPATIENT)
Dept: FAMILY MEDICINE CLINIC | Age: 64
End: 2019-04-25

## 2019-04-25 VITALS
TEMPERATURE: 98 F | RESPIRATION RATE: 16 BRPM | SYSTOLIC BLOOD PRESSURE: 130 MMHG | OXYGEN SATURATION: 98 % | DIASTOLIC BLOOD PRESSURE: 86 MMHG | HEART RATE: 80 BPM | WEIGHT: 164 LBS | BODY MASS INDEX: 29.06 KG/M2 | HEIGHT: 63 IN

## 2019-04-25 DIAGNOSIS — E55.9 VITAMIN D DEFICIENCY: ICD-10-CM

## 2019-04-25 DIAGNOSIS — F41.9 ANXIETY: ICD-10-CM

## 2019-04-25 DIAGNOSIS — G43.809 OTHER MIGRAINE WITHOUT STATUS MIGRAINOSUS, NOT INTRACTABLE: ICD-10-CM

## 2019-04-25 DIAGNOSIS — E78.5 HYPERLIPIDEMIA WITH TARGET LDL LESS THAN 130: Primary | ICD-10-CM

## 2019-04-25 DIAGNOSIS — K76.0 FATTY LIVER: ICD-10-CM

## 2019-04-25 RX ORDER — ATORVASTATIN CALCIUM 40 MG/1
40 TABLET, FILM COATED ORAL
Qty: 90 TAB | Refills: 3 | Status: SHIPPED | OUTPATIENT
Start: 2019-04-25 | End: 2020-03-31 | Stop reason: SDUPTHER

## 2019-04-25 RX ORDER — ERGOCALCIFEROL 1.25 MG/1
50000 CAPSULE ORAL
Qty: 12 CAP | Refills: 3 | Status: SHIPPED | OUTPATIENT
Start: 2019-04-25 | End: 2020-07-07 | Stop reason: SDUPTHER

## 2019-04-25 NOTE — PROGRESS NOTES
1. Have you been to the ER, urgent care clinic since your last visit? Hospitalized since your last visit? No    2. Have you seen or consulted any other health care providers outside of the [address] [place] [address] since your last visit? Include any pap smears or colon screening.  No

## 2019-04-25 NOTE — PROGRESS NOTES
SUBJECTIVE  Chief Complaint   Patient presents with    Cholesterol Problem    Anxiety      Follow-up for lab review for hypercholesterolemia, anxiety, fatty liver, and hypovitaminosis D. She is due to see Roselyn Tan for her annual WWE. She did see her last April. Hypercholesterolemia - Taking Lipitor 40mg with no side effects. No myalgias or cramping reported. No chest pain or dyspnea. She is on a new lifestyle modification program with diet and exercise. She has had gradual weight loss and feels better overall. She is overdue to see GI for colonoscopy and her liver cystI. She was due in 9/2017 due to an 8mm polyp. She is well aware and says it has been her intention to call to schedule. The patient's anxiety is controlled on Effexor. She has some anxiety over her weight as she has gained some and her child who has ADHD. The patient denies any harmful ideation. Takes Zomig as needed for rare migraines. Says she gets these about 1 per month. OBJECTIVE    Blood pressure 130/86, pulse 80, temperature 98 °F (36.7 °C), temperature source Oral, resp. rate 16, height 5' 3\" (1.6 m), weight 164 lb (74.4 kg), SpO2 98 %. General:  Alert, cooperative, well appearing, in no apparent distress. Chest: clear, no w/r/r. Heart: normal s1s2, RRR, no murmurs. Ext: no swelling present. Psych: normal affect. Mood good. Oriented x 3. Judgement and insight intact. ASSESSMENT / PLAN    ICD-10-CM ICD-9-CM    1. Hyperlipidemia with target LDL less than 130 E78.5 272.4 atorvastatin (LIPITOR) 40 mg tablet   2. Anxiety F41.9 300.00    3. Fatty liver K76.0 571.8    4. Body mass index (BMI) of 25.0 to 29.9 E66.3 278.02    5. Vitamin D deficiency E55.9 268.9    6. Other migraine without status migrainosus, not intractable G43.809 346.80      Reviewed labs. Hyperlipidemia - Continue Lipitor 40mg nightly. Encourage diet and exercise. Anxiety - Controlled. Continue Effexor.     BMI>25 - Encourage diet and exercise. Doing well on new program.     Liver cyst / fatty liver / colon polyp (adenomatous) - advised on importance of GI follow-up. She is reminded once again that she is well overdue for CRCS. Vit D def - controlled in the past on vitamin D 50,000 weekly. Check current levels. Migraines - Zomig as needed for migraines. Advised on importance of CPE / well woman exam.  She will schedule with Ulysses Higginbotham this summer to review well woman preventative issues. She is advised she is due for mammogram.      All chart history elements were reviewed by me at the time of the visit even though marked at time of note closure. Patient understands our medical plan. Patient has provided input and agrees with goals. Alternatives have been explained and offered. All questions answered. The patient is to call if condition worsens or fails to improve. Follow-up and Dispositions    · Return in about 2 months (around 6/25/2019) for well woman/pap smear with David Hamilton and in 1 year for physical with Dr. Chiqui Acuña.

## 2019-04-25 NOTE — PATIENT INSTRUCTIONS
A Healthy Lifestyle: Care Instructions  Your Care Instructions    A healthy lifestyle can help you feel good, stay at a healthy weight, and have plenty of energy for both work and play. A healthy lifestyle is something you can share with your whole family. A healthy lifestyle also can lower your risk for serious health problems, such as high blood pressure, heart disease, and diabetes. You can follow a few steps listed below to improve your health and the health of your family. Follow-up care is a key part of your treatment and safety. Be sure to make and go to all appointments, and call your doctor if you are having problems. It's also a good idea to know your test results and keep a list of the medicines you take. How can you care for yourself at home? · Do not eat too much sugar, fat, or fast foods. You can still have dessert and treats now and then. The goal is moderation. · Start small to improve your eating habits. Pay attention to portion sizes, drink less juice and soda pop, and eat more fruits and vegetables. ? Eat a healthy amount of food. A 3-ounce serving of meat, for example, is about the size of a deck of cards. Fill the rest of your plate with vegetables and whole grains. ? Limit the amount of soda and sports drinks you have every day. Drink more water when you are thirsty. ? Eat at least 5 servings of fruits and vegetables every day. It may seem like a lot, but it is not hard to reach this goal. A serving or helping is 1 piece of fruit, 1 cup of vegetables, or 2 cups of leafy, raw vegetables. Have an apple or some carrot sticks as an afternoon snack instead of a candy bar. Try to have fruits and/or vegetables at every meal.  · Make exercise part of your daily routine. You may want to start with simple activities, such as walking, bicycling, or slow swimming. Try to be active 30 to 60 minutes every day. You do not need to do all 30 to 60 minutes all at once.  For example, you can exercise 3 times a day for 10 or 20 minutes. Moderate exercise is safe for most people, but it is always a good idea to talk to your doctor before starting an exercise program.  · Keep moving. aMdina Bonds the lawn, work in the garden, or [employer]. Take the stairs instead of the elevator at work. · If you smoke, quit. People who smoke have an increased risk for heart attack, stroke, cancer, and other lung illnesses. Quitting is hard, but there are ways to boost your chance of quitting tobacco for good. ? Use nicotine gum, patches, or lozenges. ? Ask your doctor about stop-smoking programs and medicines. ? Keep trying. In addition to reducing your risk of diseases in the future, you will notice some benefits soon after you stop using tobacco. If you have shortness of breath or asthma symptoms, they will likely get better within a few weeks after you quit. · Limit how much alcohol you drink. Moderate amounts of alcohol (up to 2 drinks a day for men, 1 drink a day for women) are okay. But drinking too much can lead to liver problems, high blood pressure, and other health problems. Family health  If you have a family, there are many things you can do together to improve your health. · Eat meals together as a family as often as possible. · Eat healthy foods. This includes fruits, vegetables, lean meats and dairy, and whole grains. · Include your family in your fitness plan. Most people think of activities such as jogging or tennis as the way to fitness, but there are many ways you and your family can be more active. Anything that makes you breathe hard and gets your heart pumping is exercise. Here are some tips:  ? Walk to do errands or to take your child to school or the bus.  ? Go for a family bike ride after dinner instead of watching TV. Where can you learn more? Go to http://graciela-emani.info/. Enter X702 in the search box to learn more about \"A Healthy Lifestyle: Care Instructions. \"  Current as of: September 11, 2018  Content Version: 11.9  © 1355-2097 [employer]. Care instructions adapted under license by [employer] (which disclaims liability or warranty for this information). If you have questions about a medical condition or this instruction, always ask your healthcare professional. [address] any warranty or liability for your use of this information.     Follow up 2 months for well woman/pap smear with Janie Askew and in 1 year for physical with Dr. Hermelinda Cuellar

## 2020-03-31 DIAGNOSIS — E78.5 HYPERLIPIDEMIA WITH TARGET LDL LESS THAN 130: ICD-10-CM

## 2020-03-31 DIAGNOSIS — F41.9 ANXIETY: ICD-10-CM

## 2020-03-31 RX ORDER — VENLAFAXINE HYDROCHLORIDE 75 MG/1
75 CAPSULE, EXTENDED RELEASE ORAL DAILY
Qty: 90 CAP | Refills: 0 | Status: SHIPPED | OUTPATIENT
Start: 2020-03-31 | End: 2020-07-07 | Stop reason: SDUPTHER

## 2020-03-31 RX ORDER — ATORVASTATIN CALCIUM 40 MG/1
40 TABLET, FILM COATED ORAL
Qty: 90 TAB | Refills: 0 | Status: SHIPPED | OUTPATIENT
Start: 2020-03-31 | End: 2020-07-07 | Stop reason: SDUPTHER

## 2020-07-07 DIAGNOSIS — E55.9 VITAMIN D DEFICIENCY: Primary | ICD-10-CM

## 2020-07-07 DIAGNOSIS — R73.01 IFG (IMPAIRED FASTING GLUCOSE): ICD-10-CM

## 2020-07-07 DIAGNOSIS — E78.5 HYPERLIPIDEMIA WITH TARGET LDL LESS THAN 130: ICD-10-CM

## 2020-07-07 DIAGNOSIS — K76.0 FATTY LIVER: ICD-10-CM

## 2020-07-07 DIAGNOSIS — F41.9 ANXIETY: ICD-10-CM

## 2020-07-07 RX ORDER — VENLAFAXINE HYDROCHLORIDE 75 MG/1
75 CAPSULE, EXTENDED RELEASE ORAL DAILY
Qty: 90 CAP | Refills: 0 | Status: SHIPPED | OUTPATIENT
Start: 2020-07-07 | End: 2020-07-13 | Stop reason: SDUPTHER

## 2020-07-07 RX ORDER — ATORVASTATIN CALCIUM 40 MG/1
40 TABLET, FILM COATED ORAL
Qty: 90 TAB | Refills: 0 | Status: SHIPPED | OUTPATIENT
Start: 2020-07-07 | End: 2020-09-16

## 2020-07-07 RX ORDER — ERGOCALCIFEROL 1.25 MG/1
50000 CAPSULE ORAL
Qty: 12 CAP | Refills: 3 | Status: SHIPPED | OUTPATIENT
Start: 2020-07-07 | End: 2021-03-19 | Stop reason: SDUPTHER

## 2020-07-07 NOTE — TELEPHONE ENCOUNTER
Last OV: 4/25/19  Last labs: 4/18/19  Next OV and labs:8/18/2020    Per patient she will be leaving next week and will need her medications.

## 2020-07-13 DIAGNOSIS — F41.9 ANXIETY: ICD-10-CM

## 2020-07-13 RX ORDER — VENLAFAXINE HYDROCHLORIDE 75 MG/1
75 CAPSULE, EXTENDED RELEASE ORAL DAILY
Qty: 14 CAP | Refills: 0 | Status: SHIPPED | OUTPATIENT
Start: 2020-07-13 | End: 2020-09-17 | Stop reason: SDUPTHER

## 2020-07-13 NOTE — TELEPHONE ENCOUNTER
Patient Comment: Dr Eric Anderson faxed my order last Tuesday to [facility]. Chandra Dyson just shipped it yesterday and we are leaving on vacation on Thursday morning.  I am following the tracking on this but I dont know if I will get it on Wednesday.  Is there a way he could request 14 pills from [address] to get me through the vacation ?  I have called [facility] 3 times following up on this medication.  Thanks.

## 2020-08-11 ENCOUNTER — HOSPITAL ENCOUNTER (OUTPATIENT)
Dept: LAB | Age: 65
Discharge: HOME | End: 2020-08-11
Payer: OTHER GOVERNMENT

## 2020-08-11 DIAGNOSIS — K76.0 FATTY LIVER: ICD-10-CM

## 2020-08-11 DIAGNOSIS — E55.9 VITAMIN D DEFICIENCY: ICD-10-CM

## 2020-08-11 DIAGNOSIS — R73.01 IFG (IMPAIRED FASTING GLUCOSE): ICD-10-CM

## 2020-08-11 DIAGNOSIS — E78.5 HYPERLIPIDEMIA WITH TARGET LDL LESS THAN 130: ICD-10-CM

## 2020-08-11 LAB
25(OH)D3 SERPL-MCNC: 50.4 NG/ML (ref 30–100)
ALBUMIN SERPL-MCNC: 3.9 G/DL (ref 3.4–5)
ALBUMIN/GLOB SERPL: 1 {RATIO} (ref 0.8–1.7)
ALP SERPL-CCNC: 87 U/L (ref 45–117)
ALT SERPL-CCNC: 65 U/L (ref 13–56)
ANION GAP SERPL CALC-SCNC: 4 MMOL/L (ref 3–18)
AST SERPL-CCNC: 44 U/L (ref 10–38)
BASOPHILS # BLD: 0 K/UL (ref 0–0.1)
BASOPHILS NFR BLD: 0 % (ref 0–2)
BILIRUB SERPL-MCNC: 0.6 MG/DL (ref 0.2–1)
BUN SERPL-MCNC: 11 MG/DL (ref 7–18)
BUN/CREAT SERPL: 15 (ref 12–20)
CALCIUM SERPL-MCNC: 9.2 MG/DL (ref 8.5–10.1)
CHLORIDE SERPL-SCNC: 106 MMOL/L (ref 100–111)
CHOLEST SERPL-MCNC: 185 MG/DL
CO2 SERPL-SCNC: 32 MMOL/L (ref 21–32)
CREAT SERPL-MCNC: 0.74 MG/DL (ref 0.6–1.3)
DIFFERENTIAL METHOD BLD: NORMAL
EOSINOPHIL # BLD: 0.2 K/UL (ref 0–0.4)
EOSINOPHIL NFR BLD: 3 % (ref 0–5)
ERYTHROCYTE [DISTWIDTH] IN BLOOD BY AUTOMATED COUNT: 13.6 % (ref 11.6–14.5)
GLOBULIN SER CALC-MCNC: 3.9 G/DL (ref 2–4)
GLUCOSE SERPL-MCNC: 90 MG/DL (ref 74–99)
HBA1C MFR BLD: 6.4 % (ref 4.2–5.6)
HCT VFR BLD AUTO: 43.6 % (ref 35–45)
HDLC SERPL-MCNC: 47 MG/DL (ref 40–60)
HDLC SERPL: 3.9 {RATIO} (ref 0–5)
HGB BLD-MCNC: 14.4 G/DL (ref 12–16)
LDLC SERPL CALC-MCNC: 110.6 MG/DL (ref 0–100)
LIPID PROFILE,FLP: ABNORMAL
LYMPHOCYTES # BLD: 2.7 K/UL (ref 0.9–3.6)
LYMPHOCYTES NFR BLD: 39 % (ref 21–52)
MCH RBC QN AUTO: 28.7 PG (ref 24–34)
MCHC RBC AUTO-ENTMCNC: 33 G/DL (ref 31–37)
MCV RBC AUTO: 86.9 FL (ref 74–97)
MONOCYTES # BLD: 0.5 K/UL (ref 0.05–1.2)
MONOCYTES NFR BLD: 7 % (ref 3–10)
NEUTS SEG # BLD: 3.6 K/UL (ref 1.8–8)
NEUTS SEG NFR BLD: 51 % (ref 40–73)
PLATELET # BLD AUTO: 333 K/UL (ref 135–420)
PMV BLD AUTO: 11.1 FL (ref 9.2–11.8)
POTASSIUM SERPL-SCNC: 4.4 MMOL/L (ref 3.5–5.5)
PROT SERPL-MCNC: 7.8 G/DL (ref 6.4–8.2)
RBC # BLD AUTO: 5.02 M/UL (ref 4.2–5.3)
SODIUM SERPL-SCNC: 142 MMOL/L (ref 136–145)
TRIGL SERPL-MCNC: 137 MG/DL (ref ?–150)
VLDLC SERPL CALC-MCNC: 27.4 MG/DL
WBC # BLD AUTO: 7.1 K/UL (ref 4.6–13.2)

## 2020-08-11 PROCEDURE — 80053 COMPREHEN METABOLIC PANEL: CPT

## 2020-08-11 PROCEDURE — 80061 LIPID PANEL: CPT

## 2020-08-11 PROCEDURE — 36415 COLL VENOUS BLD VENIPUNCTURE: CPT

## 2020-08-11 PROCEDURE — 83036 HEMOGLOBIN GLYCOSYLATED A1C: CPT

## 2020-08-11 PROCEDURE — 82306 VITAMIN D 25 HYDROXY: CPT

## 2020-08-11 PROCEDURE — 85025 COMPLETE CBC W/AUTO DIFF WBC: CPT

## 2020-08-17 ENCOUNTER — TELEPHONE (OUTPATIENT)
Dept: FAMILY MEDICINE CLINIC | Age: 65
End: 2020-08-17

## 2020-08-18 ENCOUNTER — OFFICE VISIT (OUTPATIENT)
Dept: FAMILY MEDICINE CLINIC | Age: 65
End: 2020-08-18

## 2020-08-18 VITALS
SYSTOLIC BLOOD PRESSURE: 128 MMHG | WEIGHT: 171 LBS | HEIGHT: 63 IN | DIASTOLIC BLOOD PRESSURE: 78 MMHG | RESPIRATION RATE: 16 BRPM | HEART RATE: 87 BPM | BODY MASS INDEX: 30.3 KG/M2 | OXYGEN SATURATION: 95 % | TEMPERATURE: 98.1 F

## 2020-08-18 DIAGNOSIS — E78.5 HYPERLIPIDEMIA WITH TARGET LDL LESS THAN 130: Primary | ICD-10-CM

## 2020-08-18 DIAGNOSIS — Z12.39 SCREENING FOR BREAST CANCER: ICD-10-CM

## 2020-08-18 DIAGNOSIS — F41.9 ANXIETY: ICD-10-CM

## 2020-08-18 DIAGNOSIS — E66.9 OBESITY, UNSPECIFIED CLASSIFICATION, UNSPECIFIED OBESITY TYPE, UNSPECIFIED WHETHER SERIOUS COMORBIDITY PRESENT: ICD-10-CM

## 2020-08-18 DIAGNOSIS — G43.809 OTHER MIGRAINE WITHOUT STATUS MIGRAINOSUS, NOT INTRACTABLE: ICD-10-CM

## 2020-08-18 DIAGNOSIS — E55.9 VITAMIN D DEFICIENCY: ICD-10-CM

## 2020-08-18 DIAGNOSIS — K76.0 FATTY LIVER: ICD-10-CM

## 2020-08-18 DIAGNOSIS — R73.01 IFG (IMPAIRED FASTING GLUCOSE): ICD-10-CM

## 2020-08-18 NOTE — PROGRESS NOTES
SUBJECTIVE  Chief Complaint   Patient presents with    Cholesterol Problem    Results     review      Follow-up for lab review for hypercholesterolemia, anxiety, fatty liver, and hypovitaminosis D. She was seeing our PA for annual [facility] which she is overdue for. She has had a colonoscopy and has seen GI in Aug.  No GI complaints. Has had weight gain. Has known fatty liver. Hypercholesterolemia - Taking Lipitor 40mg with no side effects. No myalgias or cramping reported. No chest pain or dyspnea. She is on a new lifestyle modification program with diet and exercise. She has had gradual weight loss and feels better overall. The patient's anxiety is controlled on Effexor. She is no longer needing the extra 37.5mg tab. The patient denies any harmful ideation. Takes Zomig as needed for rare migraines. Says she gets these about 1 per month. ROS:  History obtained from the patient  · General: negative for - chills, fever  · HEENT: no sore throat, nasal congestion  · Respiratory: no cough, shortness of breath, or wheezing  · Cardiovascular: no chest pain or dyspnea on exertion  · Gastrointestinal: no abdominal pain, N/V, change in bowel habits, or black or bloody stools  · Endo:  No polyuria or polydipsia. OBJECTIVE    Blood pressure 128/78, pulse 87, temperature 98.1 °F (36.7 °C), temperature source Oral, resp. rate 16, height 5' 3\" (1.6 m), weight 171 lb (77.6 kg), SpO2 95 %. General:  Alert, cooperative, well appearing, in no apparent distress. Chest: clear, no w/r/r. Heart: normal s1s2, RRR, no murmurs. Ext: no swelling present. Psych: normal affect. Mood good. Oriented x 3. Judgement and insight intact.      Results for orders placed or performed during the hospital encounter of 08/11/20   CBC WITH AUTOMATED DIFF   Result Value Ref Range    WBC 7.1 4.6 - 13.2 K/uL    RBC 5.02 4.20 - 5.30 M/uL    HGB 14.4 12.0 - 16.0 g/dL    HCT 43.6 35.0 - 45.0 %    MCV 86.9 74.0 - 97.0 FL    MCH 28.7 24.0 - 34.0 PG    MCHC 33.0 31.0 - 37.0 g/dL    RDW 13.6 11.6 - 14.5 %    PLATELET 794 299 - 244 K/uL    MPV 11.1 9.2 - 11.8 FL    NEUTROPHILS 51 40 - 73 %    LYMPHOCYTES 39 21 - 52 %    MONOCYTES 7 3 - 10 %    EOSINOPHILS 3 0 - 5 %    BASOPHILS 0 0 - 2 %    ABS. NEUTROPHILS 3.6 1.8 - 8.0 K/UL    ABS. LYMPHOCYTES 2.7 0.9 - 3.6 K/UL    ABS. MONOCYTES 0.5 0.05 - 1.2 K/UL    ABS. EOSINOPHILS 0.2 0.0 - 0.4 K/UL    ABS. BASOPHILS 0.0 0.0 - 0.1 K/UL    DF AUTOMATED     METABOLIC PANEL, COMPREHENSIVE   Result Value Ref Range    Sodium 142 136 - 145 mmol/L    Potassium 4.4 3.5 - 5.5 mmol/L    Chloride 106 100 - 111 mmol/L    CO2 32 21 - 32 mmol/L    Anion gap 4 3.0 - 18 mmol/L    Glucose 90 74 - 99 mg/dL    BUN 11 7.0 - 18 MG/DL    Creatinine 0.74 0.6 - 1.3 MG/DL    BUN/Creatinine ratio 15 12 - 20      GFR est AA >60 >60 ml/min/1.73m2    GFR est non-AA >60 >60 ml/min/1.73m2    Calcium 9.2 8.5 - 10.1 MG/DL    Bilirubin, total 0.6 0.2 - 1.0 MG/DL    ALT (SGPT) 65 (H) 13 - 56 U/L    AST (SGOT) 44 (H) 10 - 38 U/L    Alk. phosphatase 87 45 - 117 U/L    Protein, total 7.8 6.4 - 8.2 g/dL    Albumin 3.9 3.4 - 5.0 g/dL    Globulin 3.9 2.0 - 4.0 g/dL    A-G Ratio 1.0 0.8 - 1.7     LIPID PANEL   Result Value Ref Range    LIPID PROFILE          Cholesterol, total 185 <200 MG/DL    Triglyceride 137 <150 MG/DL    HDL Cholesterol 47 40 - 60 MG/DL    LDL, calculated 110.6 (H) 0 - 100 MG/DL    VLDL, calculated 27.4 MG/DL    CHOL/HDL Ratio 3.9 0 - 5.0     VITAMIN D, 25 HYDROXY   Result Value Ref Range    Vitamin D 25-Hydroxy 50.4 30 - 100 ng/mL   HEMOGLOBIN A1C W/O EAG   Result Value Ref Range    Hemoglobin A1c 6.4 (H) 4.2 - 5.6 %     ASSESSMENT / PLAN    ICD-10-CM ICD-9-CM    1. Hyperlipidemia with target LDL less than 130  E78.5 272.4    2. Vitamin D deficiency  E55.9 268.9    3. Fatty liver  K76.0 571.8    4.  Obesity, unspecified classification, unspecified obesity type, unspecified whether serious comorbidity present E66.9 278.00    5. IFG (impaired fasting glucose)  R73.01 790.21    6. Anxiety  F41.9 300.00    7. Other migraine without status migrainosus, not intractable  G43.809 346.80    8. Screening for breast cancer  Z12.39 V76.10 GI[facility]ANY MAMMO BI SCREENING INCL CAD     Reviewed labs. Hyperlipidemia - Continue Lipitor 40mg nightly. No hepatotoxicity. Encourage diet and exercise. Vit D def - controlled on vitamin D 50,000 weekly. Obesity / fatty liver -  Encourage diet and exercise. Discussed various strategies for weight loss upon her request.    Liver cyst / fatty liver / colon polyp (adenomatous) - reviewed latest GI notes and colonoscopy. IFG - reviewed A1c. Advised on exercise and carb reduction. Advised on checking A1c in 6 months. Anxiety - Controlled. Continue Effexor. Migraines - Zomig as needed for migraines. Advised on importance of CPE / well woman exam.  She will consider getting an OB/GYN for her annual WWEs at this point. She is advised she is due for mammogram so we ordered that. All chart history elements were reviewed by me at the time of the visit even though marked at time of note closure. Patient understands our medical plan. Patient has provided input and agrees with goals. Alternatives have been explained and offered. All questions answered. The patient is to call if condition worsens or fails to improve. Follow-up and Dispositions    · Return in about 6 months (around 2/18/2021) for routine care. A1c to be done in office.

## 2020-08-18 NOTE — PROGRESS NOTES
1. Have you been to the ER, urgent care clinic since your last visit? Hospitalized since your last visit? No    2. Have you seen or consulted any other health care providers outside of the [facility] since your last visit? Include any pap smears or colon screening.  Yes Where: [facility]    Mammogram is due  Colonoscopy: done with [facility] ~3 weeks ago  Shingrix: has not had

## 2020-08-18 NOTE — PATIENT INSTRUCTIONS
A Healthy Lifestyle: Care Instructions  Your Care Instructions     A healthy lifestyle can help you feel good, stay at a healthy weight, and have plenty of energy for both work and play. A healthy lifestyle is something you can share with your whole family. A healthy lifestyle also can lower your risk for serious health problems, such as high blood pressure, heart disease, and diabetes. You can follow a few steps listed below to improve your health and the health of your family. Follow-up care is a key part of your treatment and safety. Be sure to make and go to all appointments, and call your doctor if you are having problems. It's also a good idea to know your test results and keep a list of the medicines you take. How can you care for yourself at home? · Do not eat too much sugar, fat, or fast foods. You can still have dessert and treats now and then. The goal is moderation. · Start small to improve your eating habits. Pay attention to portion sizes, drink less juice and soda pop, and eat more fruits and vegetables. ? Eat a healthy amount of food. A 3-ounce serving of meat, for example, is about the size of a deck of cards. Fill the rest of your plate with vegetables and whole grains. ? Limit the amount of soda and sports drinks you have every day. Drink more water when you are thirsty. ? Eat at least 5 servings of fruits and vegetables every day. It may seem like a lot, but it is not hard to reach this goal. A serving or helping is 1 piece of fruit, 1 cup of vegetables, or 2 cups of leafy, raw vegetables. Have an apple or some carrot sticks as an afternoon snack instead of a candy bar. Try to have fruits and/or vegetables at every meal.  · Make exercise part of your daily routine. You may want to start with simple activities, such as walking, bicycling, or slow swimming. Try to be active 30 to 60 minutes every day. You do not need to do all 30 to 60 minutes all at once.  For example, you can exercise 3 times a day for 10 or 20 minutes. Moderate exercise is safe for most people, but it is always a good idea to talk to your doctor before starting an exercise program.  · Keep moving. Claudia Locker the lawn, work in the garden, or [employer]. Take the stairs instead of the elevator at work. · If you smoke, quit. People who smoke have an increased risk for heart attack, stroke, cancer, and other lung illnesses. Quitting is hard, but there are ways to boost your chance of quitting tobacco for good. ? Use nicotine gum, patches, or lozenges. ? Ask your doctor about stop-smoking programs and medicines. ? Keep trying. In addition to reducing your risk of diseases in the future, you will notice some benefits soon after you stop using tobacco. If you have shortness of breath or asthma symptoms, they will likely get better within a few weeks after you quit. · Limit how much alcohol you drink. Moderate amounts of alcohol (up to 2 drinks a day for men, 1 drink a day for women) are okay. But drinking too much can lead to liver problems, high blood pressure, and other health problems. Family health  If you have a family, there are many things you can do together to improve your health. · Eat meals together as a family as often as possible. · Eat healthy foods. This includes fruits, vegetables, lean meats and dairy, and whole grains. · Include your family in your fitness plan. Most people think of activities such as jogging or tennis as the way to fitness, but there are many ways you and your family can be more active. Anything that makes you breathe hard and gets your heart pumping is exercise. Here are some tips:  ? Walk to do errands or to take your child to school or the bus.  ? Go for a family bike ride after dinner instead of watching TV. Where can you learn more?   Go to http://graciela-emani.info/  Enter B158 in the search box to learn more about \"A Healthy Lifestyle: Care Instructions. \"  Current as of: January 31, 2020               Content Version: 12.5  © 6004-5073 [employer]. Care instructions adapted under license by [employer] (which disclaims liability or warranty for this information). If you have questions about a medical condition or this instruction, always ask your healthcare professional. [place] [address] any warranty or liability for your use of this information.

## 2020-09-15 DIAGNOSIS — E78.5 HYPERLIPIDEMIA WITH TARGET LDL LESS THAN 130: ICD-10-CM

## 2020-09-16 RX ORDER — ATORVASTATIN CALCIUM 40 MG/1
TABLET, FILM COATED ORAL
Qty: 90 TAB | Refills: 1 | Status: SHIPPED | OUTPATIENT
Start: 2020-09-16 | End: 2021-03-19

## 2020-09-17 DIAGNOSIS — F41.9 ANXIETY: ICD-10-CM

## 2020-09-17 NOTE — TELEPHONE ENCOUNTER
This patient contacted office for the following prescriptions to be filled:    Last office visit: 8/18/2020  Follow up appointment:2/18/2020  Medication requested :   Requested Prescriptions     Pending Prescriptions Disp Refills    venlafaxine-SR (EFFEXOR-XR) 75 mg capsule 14 Cap 0     Sig: Take 1 Cap by mouth daily.      PCP: Mercedes Acharya  Mail order or Local pharmacy name [facility] [phone]

## 2020-09-18 RX ORDER — VENLAFAXINE HYDROCHLORIDE 75 MG/1
75 CAPSULE, EXTENDED RELEASE ORAL DAILY
Qty: 90 CAP | Refills: 1 | Status: SHIPPED | OUTPATIENT
Start: 2020-09-18 | End: 2020-09-28 | Stop reason: SDUPTHER

## 2020-09-28 DIAGNOSIS — F41.9 ANXIETY: ICD-10-CM

## 2020-09-28 RX ORDER — VENLAFAXINE HYDROCHLORIDE 75 MG/1
75 CAPSULE, EXTENDED RELEASE ORAL DAILY
Qty: 90 CAP | Refills: 1 | Status: SHIPPED | OUTPATIENT
Start: 2020-09-28 | End: 2021-03-19 | Stop reason: SDUPTHER

## 2020-09-28 NOTE — TELEPHONE ENCOUNTER
This pharmacy faxed over request for the following prescriptions to be filled:    Medication requested :   Requested Prescriptions     Pending Prescriptions Disp Refills    venlafaxine-SR (EFFEXOR-XR) 75 mg capsule 90 Cap 1     Sig: Take 1 Cap by mouth daily.      PCP: Maverick Iverson 39. or Print: [facility]   Mail order or Local pharmacy Mail Order     Scheduled appointment if not seen by current providers in office:  [address] [place] [address] 8/18/2020 f/u 2/18/2021

## 2020-10-26 ENCOUNTER — HOSPITAL ENCOUNTER (OUTPATIENT)
Dept: MAMMOGRAPHY | Age: 65
Discharge: HOME | End: 2020-10-26
Attending: FAMILY MEDICINE
Payer: OTHER GOVERNMENT

## 2020-10-26 DIAGNOSIS — Z12.31 VISIT FOR SCREENING MAMMOGRAM: ICD-10-CM

## 2020-10-26 PROCEDURE — 77063 BREAST TOMOSYNTHESIS BI: CPT

## 2021-02-18 ENCOUNTER — OFFICE VISIT (OUTPATIENT)
Dept: FAMILY MEDICINE CLINIC | Age: 66
End: 2021-02-18
Payer: OTHER GOVERNMENT

## 2021-02-18 ENCOUNTER — HOSPITAL ENCOUNTER (OUTPATIENT)
Dept: GENERAL RADIOLOGY | Age: 66
Discharge: HOME | End: 2021-02-18
Payer: OTHER GOVERNMENT

## 2021-02-18 VITALS
WEIGHT: 161 LBS | BODY MASS INDEX: 28.53 KG/M2 | DIASTOLIC BLOOD PRESSURE: 64 MMHG | OXYGEN SATURATION: 95 % | TEMPERATURE: 97.5 F | HEIGHT: 63 IN | SYSTOLIC BLOOD PRESSURE: 112 MMHG | HEART RATE: 74 BPM | RESPIRATION RATE: 14 BRPM

## 2021-02-18 DIAGNOSIS — M25.552 LEFT HIP PAIN: ICD-10-CM

## 2021-02-18 DIAGNOSIS — G43.809 OTHER MIGRAINE WITHOUT STATUS MIGRAINOSUS, NOT INTRACTABLE: ICD-10-CM

## 2021-02-18 DIAGNOSIS — R73.01 IFG (IMPAIRED FASTING GLUCOSE): Primary | ICD-10-CM

## 2021-02-18 DIAGNOSIS — E55.9 VITAMIN D DEFICIENCY: ICD-10-CM

## 2021-02-18 DIAGNOSIS — R42 DIZZINESS: ICD-10-CM

## 2021-02-18 DIAGNOSIS — F41.9 ANXIETY: ICD-10-CM

## 2021-02-18 DIAGNOSIS — R01.1 HEART MURMUR: ICD-10-CM

## 2021-02-18 DIAGNOSIS — E78.5 HYPERLIPIDEMIA WITH TARGET LDL LESS THAN 130: ICD-10-CM

## 2021-02-18 DIAGNOSIS — K76.0 FATTY LIVER: ICD-10-CM

## 2021-02-18 DIAGNOSIS — E66.9 OBESITY, UNSPECIFIED CLASSIFICATION, UNSPECIFIED OBESITY TYPE, UNSPECIFIED WHETHER SERIOUS COMORBIDITY PRESENT: ICD-10-CM

## 2021-02-18 LAB — HBA1C MFR BLD HPLC: 5.6 %

## 2021-02-18 PROCEDURE — 83036 HEMOGLOBIN GLYCOSYLATED A1C: CPT | Performed by: FAMILY MEDICINE

## 2021-02-18 PROCEDURE — 99214 OFFICE O/P EST MOD 30 MIN: CPT | Performed by: FAMILY MEDICINE

## 2021-02-18 PROCEDURE — 73502 X-RAY EXAM HIP UNI 2-3 VIEWS: CPT

## 2021-02-18 NOTE — PATIENT INSTRUCTIONS
A Healthy Lifestyle: Care Instructions Your Care Instructions A healthy lifestyle can help you feel good, stay at a healthy weight, and have plenty of energy for both work and play. A healthy lifestyle is something you can share with your whole family. A healthy lifestyle also can lower your risk for serious health problems, such as high blood pressure, heart disease, and diabetes. You can follow a few steps listed below to improve your health and the health of your family. Follow-up care is a key part of your treatment and safety. Be sure to make and go to all appointments, and call your doctor if you are having problems. It's also a good idea to know your test results and keep a list of the medicines you take. How can you care for yourself at home? · Do not eat too much sugar, fat, or fast foods. You can still have dessert and treats now and then. The goal is moderation. · Start small to improve your eating habits. Pay attention to portion sizes, drink less juice and soda pop, and eat more fruits and vegetables. ? Eat a healthy amount of food. A 3-ounce serving of meat, for example, is about the size of a deck of cards. Fill the rest of your plate with vegetables and whole grains. ? Limit the amount of soda and sports drinks you have every day. Drink more water when you are thirsty. ? Eat at least 5 servings of fruits and vegetables every day. It may seem like a lot, but it is not hard to reach this goal. A serving or helping is 1 piece of fruit, 1 cup of vegetables, or 2 cups of leafy, raw vegetables. Have an apple or some carrot sticks as an afternoon snack instead of a candy bar.  Try to have fruits and/or vegetables at every meal. 
 · Make exercise part of your daily routine. You may want to start with simple activities, such as walking, bicycling, or slow swimming. Try to be active 30 to 60 minutes every day. You do not need to do all 30 to 60 minutes all at once. For example, you can exercise 3 times a day for 10 or 20 minutes. Moderate exercise is safe for most people, but it is always a good idea to talk to your doctor before starting an exercise program. 
· Keep moving. Mark Anthony Deems the lawn, work in the garden, or [employer]. Take the stairs instead of the elevator at work. · If you smoke, quit. People who smoke have an increased risk for heart attack, stroke, cancer, and other lung illnesses. Quitting is hard, but there are ways to boost your chance of quitting tobacco for good. ? Use nicotine gum, patches, or lozenges. ? Ask your doctor about stop-smoking programs and medicines. ? Keep trying. In addition to reducing your risk of diseases in the future, you will notice some benefits soon after you stop using tobacco. If you have shortness of breath or asthma symptoms, they will likely get better within a few weeks after you quit. · Limit how much alcohol you drink. Moderate amounts of alcohol (up to 2 drinks a day for men, 1 drink a day for women) are okay. But drinking too much can lead to liver problems, high blood pressure, and other health problems. Family health If you have a family, there are many things you can do together to improve your health. · Eat meals together as a family as often as possible. · Eat healthy foods. This includes fruits, vegetables, lean meats and dairy, and whole grains. · Include your family in your fitness plan. Most people think of activities such as jogging or tennis as the way to fitness, but there are many ways you and your family can be more active. Anything that makes you breathe hard and gets your heart pumping is exercise. Here are some tips: ? Walk to do errands or to take your child to school or the bus. 
? Go for a family bike ride after dinner instead of watching TV. Where can you learn more? Go to http://www.gray.com/ Enter O536 in the search box to learn more about \"A Healthy Lifestyle: Care Instructions. \" Current as of: January 31, 2020               Content Version: 12.6 © 2006-2020 [employer]. Care instructions adapted under license by [employer] (which disclaims liability or warranty for this information). If you have questions about a medical condition or this instruction, always ask your healthcare professional. [place] [address] any warranty or liability for your use of this information. Follow up in 6 months for routine care and fasting labs to be done 3-5 days prior, x-ray today and 2D Echo to be scheduled [phone]

## 2021-02-18 NOTE — PROGRESS NOTES
1. Have you been to the ER, urgent care clinic since your last visit? Hospitalized since your last visit? no    2. Have you seen or consulted any other health care providers outside of the [address] [place] [address] since your last visit? Include any pap smears or colon screening.  Yes, Dr. Patricio Escobedo (pod)

## 2021-02-25 ENCOUNTER — HOSPITAL ENCOUNTER (OUTPATIENT)
Dept: NON INVASIVE DIAGNOSTICS | Age: 66
Discharge: HOME | End: 2021-02-25
Attending: FAMILY MEDICINE
Payer: OTHER GOVERNMENT

## 2021-02-25 VITALS
WEIGHT: 161 LBS | SYSTOLIC BLOOD PRESSURE: 112 MMHG | BODY MASS INDEX: 28.53 KG/M2 | DIASTOLIC BLOOD PRESSURE: 64 MMHG | HEIGHT: 63 IN

## 2021-02-25 DIAGNOSIS — R42 DIZZINESS: ICD-10-CM

## 2021-02-25 DIAGNOSIS — R01.1 HEART MURMUR: ICD-10-CM

## 2021-02-25 LAB
ECHO AO ROOT DIAM: 2.86 CM
ECHO LA AREA 4C: 11.4 CM2
ECHO LA VOL 2C: 19.43 ML (ref 22–52)
ECHO LA VOL 4C: 22.49 ML (ref 22–52)
ECHO LA VOL BP: 23.27 ML (ref 22–52)
ECHO LA VOL/BSA BIPLANE: 13.2 ML/M2 (ref 16–28)
ECHO LA VOLUME INDEX A2C: 11.02 ML/M2 (ref 16–28)
ECHO LA VOLUME INDEX A4C: 12.75 ML/M2 (ref 16–28)
ECHO LV E' LATERAL VELOCITY: 8.62 CM/S
ECHO LV E' SEPTAL VELOCITY: 6.77 CM/S
ECHO LV INTERNAL DIMENSION DIASTOLIC: 3.9 CM (ref 3.9–5.3)
ECHO LV INTERNAL DIMENSION SYSTOLIC: 2.61 CM
ECHO LV IVSD: 0.91 CM (ref 0.6–0.9)
ECHO LV MASS 2D: 128.3 G (ref 67–162)
ECHO LV MASS INDEX 2D: 72.7 G/M2 (ref 43–95)
ECHO LV POSTERIOR WALL DIASTOLIC: 1.16 CM (ref 0.6–0.9)
ECHO LVOT CARDIAC OUTPUT: 4.75 LITER/MINUTE
ECHO LVOT DIAM: 1.75 CM
ECHO LVOT PEAK GRADIENT: 6.67 MMHG
ECHO LVOT PEAK VELOCITY: 129.15 CM/S
ECHO LVOT SV: 62.8 ML
ECHO LVOT VTI: 26.01 CM
ECHO MV A VELOCITY: 88.84 CM/S
ECHO MV E DECELERATION TIME (DT): 196.47 MS
ECHO MV E VELOCITY: 67.07 CM/S
ECHO MV E/A RATIO: 0.75
ECHO MV E/E' LATERAL: 7.78
ECHO MV E/E' RATIO (AVERAGED): 8.84
ECHO MV E/E' SEPTAL: 9.91
ECHO RV TAPSE: 1.72 CM (ref 1.5–2)
LVOT MG: 3.31 MMHG

## 2021-02-25 PROCEDURE — 93306 TTE W/DOPPLER COMPLETE: CPT | Performed by: INTERNAL MEDICINE

## 2021-02-25 PROCEDURE — 93306 TTE W/DOPPLER COMPLETE: CPT

## 2021-03-19 DIAGNOSIS — E55.9 VITAMIN D DEFICIENCY: ICD-10-CM

## 2021-03-19 DIAGNOSIS — F41.9 ANXIETY: ICD-10-CM

## 2021-03-19 DIAGNOSIS — E78.5 HYPERLIPIDEMIA WITH TARGET LDL LESS THAN 130: ICD-10-CM

## 2021-03-19 RX ORDER — VENLAFAXINE HYDROCHLORIDE 75 MG/1
75 CAPSULE, EXTENDED RELEASE ORAL DAILY
Qty: 90 CAP | Refills: 1 | Status: SHIPPED | OUTPATIENT
Start: 2021-03-19 | End: 2021-10-23

## 2021-03-19 RX ORDER — ATORVASTATIN CALCIUM 40 MG/1
TABLET, FILM COATED ORAL
Qty: 90 TAB | Refills: 1 | Status: SHIPPED | OUTPATIENT
Start: 2021-03-19 | End: 2021-10-23

## 2021-03-19 RX ORDER — ERGOCALCIFEROL 1.25 MG/1
50000 CAPSULE ORAL
Qty: 12 CAP | Refills: 3 | Status: SHIPPED | OUTPATIENT
Start: 2021-03-19 | End: 2022-01-24

## 2021-08-11 ENCOUNTER — HOSPITAL ENCOUNTER (OUTPATIENT)
Dept: LAB | Age: 66
Discharge: HOME | End: 2021-08-11
Payer: OTHER GOVERNMENT

## 2021-08-11 DIAGNOSIS — K76.0 FATTY LIVER: ICD-10-CM

## 2021-08-11 DIAGNOSIS — E78.5 HYPERLIPIDEMIA WITH TARGET LDL LESS THAN 130: ICD-10-CM

## 2021-08-11 DIAGNOSIS — R73.01 IFG (IMPAIRED FASTING GLUCOSE): ICD-10-CM

## 2021-08-11 LAB
ALBUMIN SERPL-MCNC: 3.9 G/DL (ref 3.4–5)
ALBUMIN/GLOB SERPL: 1.1 {RATIO} (ref 0.8–1.7)
ALP SERPL-CCNC: 98 U/L (ref 45–117)
ALT SERPL-CCNC: 34 U/L (ref 13–56)
ANION GAP SERPL CALC-SCNC: 7 MMOL/L (ref 3–18)
AST SERPL-CCNC: 18 U/L (ref 10–38)
BASOPHILS # BLD: 0.1 K/UL (ref 0–0.1)
BASOPHILS NFR BLD: 1 % (ref 0–2)
BILIRUB SERPL-MCNC: 0.7 MG/DL (ref 0.2–1)
BUN SERPL-MCNC: 17 MG/DL (ref 7–18)
BUN/CREAT SERPL: 23 (ref 12–20)
CALCIUM SERPL-MCNC: 9.1 MG/DL (ref 8.5–10.1)
CHLORIDE SERPL-SCNC: 105 MMOL/L (ref 100–111)
CHOLEST SERPL-MCNC: 239 MG/DL
CO2 SERPL-SCNC: 30 MMOL/L (ref 21–32)
CREAT SERPL-MCNC: 0.73 MG/DL (ref 0.6–1.3)
DIFFERENTIAL METHOD BLD: NORMAL
EOSINOPHIL # BLD: 0.3 K/UL (ref 0–0.4)
EOSINOPHIL NFR BLD: 3 % (ref 0–5)
ERYTHROCYTE [DISTWIDTH] IN BLOOD BY AUTOMATED COUNT: 13.6 % (ref 11.6–14.5)
EST. AVERAGE GLUCOSE BLD GHB EST-MCNC: 120 MG/DL
GLOBULIN SER CALC-MCNC: 3.7 G/DL (ref 2–4)
GLUCOSE SERPL-MCNC: 94 MG/DL (ref 74–99)
HBA1C MFR BLD: 5.8 % (ref 4.2–5.6)
HCT VFR BLD AUTO: 43.2 % (ref 35–45)
HDLC SERPL-MCNC: 52 MG/DL (ref 40–60)
HDLC SERPL: 4.6 {RATIO} (ref 0–5)
HGB BLD-MCNC: 13.7 G/DL (ref 12–16)
LDLC SERPL CALC-MCNC: 148.2 MG/DL (ref 0–100)
LIPID PROFILE,FLP: ABNORMAL
LYMPHOCYTES # BLD: 2.9 K/UL (ref 0.9–3.6)
LYMPHOCYTES NFR BLD: 33 % (ref 21–52)
MCH RBC QN AUTO: 28 PG (ref 24–34)
MCHC RBC AUTO-ENTMCNC: 31.7 G/DL (ref 31–37)
MCV RBC AUTO: 88.2 FL (ref 74–97)
MONOCYTES # BLD: 0.6 K/UL (ref 0.05–1.2)
MONOCYTES NFR BLD: 7 % (ref 3–10)
NEUTS SEG # BLD: 4.9 K/UL (ref 1.8–8)
NEUTS SEG NFR BLD: 56 % (ref 40–73)
PLATELET # BLD AUTO: 330 K/UL (ref 135–420)
PMV BLD AUTO: 10.6 FL (ref 9.2–11.8)
POTASSIUM SERPL-SCNC: 4.5 MMOL/L (ref 3.5–5.5)
PROT SERPL-MCNC: 7.6 G/DL (ref 6.4–8.2)
RBC # BLD AUTO: 4.9 M/UL (ref 4.2–5.3)
SODIUM SERPL-SCNC: 142 MMOL/L (ref 136–145)
TRIGL SERPL-MCNC: 194 MG/DL (ref ?–150)
VLDLC SERPL CALC-MCNC: 38.8 MG/DL
WBC # BLD AUTO: 8.7 K/UL (ref 4.6–13.2)

## 2021-08-11 PROCEDURE — 36415 COLL VENOUS BLD VENIPUNCTURE: CPT

## 2021-08-11 PROCEDURE — 80061 LIPID PANEL: CPT

## 2021-08-11 PROCEDURE — 80053 COMPREHEN METABOLIC PANEL: CPT

## 2021-08-11 PROCEDURE — 83036 HEMOGLOBIN GLYCOSYLATED A1C: CPT

## 2021-08-11 PROCEDURE — 85025 COMPLETE CBC W/AUTO DIFF WBC: CPT

## 2021-08-19 ENCOUNTER — OFFICE VISIT (OUTPATIENT)
Dept: FAMILY MEDICINE CLINIC | Age: 66
End: 2021-08-19
Payer: OTHER GOVERNMENT

## 2021-08-19 VITALS
WEIGHT: 157 LBS | TEMPERATURE: 98.6 F | HEIGHT: 63 IN | DIASTOLIC BLOOD PRESSURE: 74 MMHG | BODY MASS INDEX: 27.82 KG/M2 | HEART RATE: 85 BPM | RESPIRATION RATE: 16 BRPM | SYSTOLIC BLOOD PRESSURE: 110 MMHG | OXYGEN SATURATION: 95 %

## 2021-08-19 DIAGNOSIS — R73.01 IFG (IMPAIRED FASTING GLUCOSE): Primary | ICD-10-CM

## 2021-08-19 DIAGNOSIS — K76.0 FATTY LIVER: ICD-10-CM

## 2021-08-19 DIAGNOSIS — E55.9 VITAMIN D DEFICIENCY: ICD-10-CM

## 2021-08-19 DIAGNOSIS — E78.5 HYPERLIPIDEMIA WITH TARGET LDL LESS THAN 130: ICD-10-CM

## 2021-08-19 DIAGNOSIS — G43.809 OTHER MIGRAINE WITHOUT STATUS MIGRAINOSUS, NOT INTRACTABLE: ICD-10-CM

## 2021-08-19 DIAGNOSIS — F41.9 ANXIETY: ICD-10-CM

## 2021-08-19 PROCEDURE — 99214 OFFICE O/P EST MOD 30 MIN: CPT | Performed by: FAMILY MEDICINE

## 2021-08-19 NOTE — PATIENT INSTRUCTIONS
A Healthy Lifestyle: Care Instructions  Your Care Instructions     A healthy lifestyle can help you feel good, stay at a healthy weight, and have plenty of energy for both work and play. A healthy lifestyle is something you can share with your whole family. A healthy lifestyle also can lower your risk for serious health problems, such as high blood pressure, heart disease, and diabetes. You can follow a few steps listed below to improve your health and the health of your family. Follow-up care is a key part of your treatment and safety. Be sure to make and go to all appointments, and call your doctor if you are having problems. It's also a good idea to know your test results and keep a list of the medicines you take. How can you care for yourself at home? · Do not eat too much sugar, fat, or fast foods. You can still have dessert and treats now and then. The goal is moderation. · Start small to improve your eating habits. Pay attention to portion sizes, drink less juice and soda pop, and eat more fruits and vegetables. ? Eat a healthy amount of food. A 3-ounce serving of meat, for example, is about the size of a deck of cards. Fill the rest of your plate with vegetables and whole grains. ? Limit the amount of soda and sports drinks you have every day. Drink more water when you are thirsty. ? Eat plenty of fruits and vegetables every day. Have an apple or some carrot sticks as an afternoon snack instead of a candy bar. Try to have fruits and/or vegetables at every meal.  · Make exercise part of your daily routine. You may want to start with simple activities, such as walking, bicycling, or slow swimming. Try to be active 30 to 60 minutes every day. You do not need to do all 30 to 60 minutes all at once. For example, you can exercise 3 times a day for 10 or 20 minutes.  Moderate exercise is safe for most people, but it is always a good idea to talk to your doctor before starting an exercise program.  · Keep moving. [place] the lawn, work in the garden, or [employer]. Take the stairs instead of the elevator at work. · If you smoke, quit. People who smoke have an increased risk for heart attack, stroke, cancer, and other lung illnesses. Quitting is hard, but there are ways to boost your chance of quitting tobacco for good. ? Use nicotine gum, patches, or lozenges. ? Ask your doctor about stop-smoking programs and medicines. ? Keep trying. In addition to reducing your risk of diseases in the future, you will notice some benefits soon after you stop using tobacco. If you have shortness of breath or asthma symptoms, they will likely get better within a few weeks after you quit. · Limit how much alcohol you drink. Moderate amounts of alcohol (up to 2 drinks a day for men, 1 drink a day for women) are okay. But drinking too much can lead to liver problems, high blood pressure, and other health problems. Family health  If you have a family, there are many things you can do together to improve your health. · Eat meals together as a family as often as possible. · Eat healthy foods. This includes fruits, vegetables, lean meats and dairy, and whole grains. · Include your family in your fitness plan. Most people think of activities such as jogging or tennis as the way to fitness, but there are many ways you and your family can be more active. Anything that makes you breathe hard and gets your heart pumping is exercise. Here are some tips:  ? Walk to do errands or to take your child to school or the bus.  ? Go for a family bike ride after dinner instead of watching TV. Where can you learn more? Go to http://www.gray.com/  Enter Q068 in the search box to learn more about \"A Healthy Lifestyle: Care Instructions. \"  Current as of: September 23, 2020               Content Version: 12.8  © 1562-3393 Healthwise, Incorporated.    Care instructions adapted under license by [employer] (which disclaims liability or warranty for this information). If you have questions about a medical condition or this instruction, always ask your healthcare professional. [address] any warranty or liability for your use of this information.

## 2021-08-19 NOTE — PROGRESS NOTES
1. Have you been to the ER, urgent care clinic since your last visit? Hospitalized since your last visit? No    2. Have you seen or consulted any other health care providers outside of the [address] [place] [address] since your last visit? Include any pap smears or colon screening.  No

## 2021-08-19 NOTE — PROGRESS NOTES
SUBJECTIVE  Chief Complaint   Patient presents with    Results    Other     IFG/prediabetes    Migraine      Follow-up for lab review for hypercholesterolemia, anxiety, fatty liver, and hypovitaminosis D. Hypercholesterolemia - Taking Lipitor 40mg with no side effects however may forget doses. She has had an increase in her lipids. No myalgias or cramping reported. No chest pain or dyspnea. The patient's anxiety is controlled on Effexor. Takes Zomig as needed for rare migraines. Hip from last time better with respect to pain but still has small bruise. OBJECTIVE    Blood pressure 110/74, pulse 85, temperature 98.6 °F (37 °C), temperature source Temporal, resp. rate 16, height 5' 3\" (1.6 m), weight 157 lb (71.2 kg), SpO2 95 %. General:  Alert, cooperative, well appearing, in no apparent distress. Chest: clear, no w/r/r. Heart: normal s1s2, RRR, no murmurs. Ext: no swelling present. Psych: normal affect. Mood good. Oriented x 3. Judgement and insight intact. Results for orders placed or performed during the hospital encounter of 08/11/21   CBC WITH AUTOMATED DIFF   Result Value Ref Range    WBC 8.7 4.6 - 13.2 K/uL    RBC 4.90 4.20 - 5.30 M/uL    HGB 13.7 12.0 - 16.0 g/dL    HCT 43.2 35.0 - 45.0 %    MCV 88.2 74.0 - 97.0 FL    MCH 28.0 24.0 - 34.0 PG    MCHC 31.7 31.0 - 37.0 g/dL    RDW 13.6 11.6 - 14.5 %    PLATELET 948 169 - 230 K/uL    MPV 10.6 9.2 - 11.8 FL    NEUTROPHILS 56 40 - 73 %    LYMPHOCYTES 33 21 - 52 %    MONOCYTES 7 3 - 10 %    EOSINOPHILS 3 0 - 5 %    BASOPHILS 1 0 - 2 %    ABS. NEUTROPHILS 4.9 1.8 - 8.0 K/UL    ABS. LYMPHOCYTES 2.9 0.9 - 3.6 K/UL    ABS. MONOCYTES 0.6 0.05 - 1.2 K/UL    ABS. EOSINOPHILS 0.3 0.0 - 0.4 K/UL    ABS.  BASOPHILS 0.1 0.0 - 0.1 K/UL    DF AUTOMATED     METABOLIC PANEL, COMPREHENSIVE   Result Value Ref Range    Sodium 142 136 - 145 mmol/L    Potassium 4.5 3.5 - 5.5 mmol/L    Chloride 105 100 - 111 mmol/L    CO2 30 21 - 32 mmol/L Anion gap 7 3.0 - 18 mmol/L    Glucose 94 74 - 99 mg/dL    BUN 17 7.0 - 18 MG/DL    Creatinine 0.73 0.6 - 1.3 MG/DL    BUN/Creatinine ratio 23 (H) 12 - 20      GFR est AA >60 >60 ml/min/1.73m2    GFR est non-AA >60 >60 ml/min/1.73m2    Calcium 9.1 8.5 - 10.1 MG/DL    Bilirubin, total 0.7 0.2 - 1.0 MG/DL    ALT (SGPT) 34 13 - 56 U/L    AST (SGOT) 18 10 - 38 U/L    Alk. phosphatase 98 45 - 117 U/L    Protein, total 7.6 6.4 - 8.2 g/dL    Albumin 3.9 3.4 - 5.0 g/dL    Globulin 3.7 2.0 - 4.0 g/dL    A-G Ratio 1.1 0.8 - 1.7     LIPID PANEL   Result Value Ref Range    LIPID PROFILE          Cholesterol, total 239 (H) <200 MG/DL    Triglyceride 194 (H) <150 MG/DL    HDL Cholesterol 52 40 - 60 MG/DL    LDL, calculated 148.2 (H) 0 - 100 MG/DL    VLDL, calculated 38.8 MG/DL    CHOL/HDL Ratio 4.6 0 - 5.0     HEMOGLOBIN A1C WITH EAG   Result Value Ref Range    Hemoglobin A1c 5.8 (H) 4.2 - 5.6 %    Est. average glucose 120 mg/dL     ASSESSMENT / PLAN    ICD-10-CM ICD-9-CM    1. IFG (impaired fasting glucose)  R73.01 790.21    2. Hyperlipidemia with target LDL less than 130  E78.5 272.4    3. Fatty liver  K76.0 571.8    4. Other migraine without status migrainosus, not intractable  G43.809 346.80    5. Anxiety  F41.9 300.00    6. Vitamin D deficiency  E55.9 268.9      IFG - reviewed A1c. Advised on exercise and carb reduction. Advised on checking A1c in 6 months. Hyperlipidemia - Uncontrolled. Advised on better compliance. Continue Lipitor 40mg nightly. No hepatotoxicity. Cont diet and exercise. Fatty liver -  Cont diet and exercise. Track LFTs annually at least.    Migraines - Zomig as needed for migraines. Anxiety - Controlled. Continue Effexor. Refills as needed. Vit D def - controlled on vitamin D 50,000 weekly. Advised on importance of CPE / well woman exam with OB/GYN. All chart history elements were reviewed by me at the time of the visit even though marked at time of note closure. Patient understands our medical plan. Patient has provided input and agrees with goals. Alternatives have been explained and offered. All questions answered. The patient is to call if condition worsens or fails to improve. Follow-up and Dispositions    · Return in about 6 months (around 2/19/2022) for routine care and Welcome to Medicare exam. A1c to be done in office.

## 2021-10-23 DIAGNOSIS — F41.9 ANXIETY: ICD-10-CM

## 2021-10-23 DIAGNOSIS — E78.5 HYPERLIPIDEMIA WITH TARGET LDL LESS THAN 130: ICD-10-CM

## 2021-10-23 RX ORDER — ATORVASTATIN CALCIUM 40 MG/1
TABLET, FILM COATED ORAL
Qty: 90 TABLET | Refills: 1 | Status: SHIPPED | OUTPATIENT
Start: 2021-10-23 | End: 2022-04-21

## 2021-10-23 RX ORDER — VENLAFAXINE HYDROCHLORIDE 75 MG/1
CAPSULE, EXTENDED RELEASE ORAL
Qty: 90 CAPSULE | Refills: 1 | Status: SHIPPED | OUTPATIENT
Start: 2021-10-23 | End: 2022-04-21

## 2022-01-24 DIAGNOSIS — E55.9 VITAMIN D DEFICIENCY: ICD-10-CM

## 2022-01-24 RX ORDER — ERGOCALCIFEROL 1.25 MG/1
CAPSULE ORAL
Qty: 12 CAPSULE | Refills: 3 | Status: SHIPPED | OUTPATIENT
Start: 2022-01-24 | End: 2022-07-20 | Stop reason: SDUPTHER

## 2022-03-18 PROBLEM — I44.0 FIRST DEGREE AV BLOCK: Status: ACTIVE | Noted: 2018-04-24

## 2022-03-20 PROBLEM — E66.9 OBESITY: Status: ACTIVE | Noted: 2018-04-09

## 2022-03-29 ENCOUNTER — TELEPHONE (OUTPATIENT)
Dept: FAMILY MEDICINE CLINIC | Age: 67
End: 2022-03-29

## 2022-03-29 NOTE — TELEPHONE ENCOUNTER
Date of surgery 6/6/2022  Type of surgery Cataract surgery of right eye  Facility that procedure will be performed [facility] name that will perform procedure Dr. Brenda Hopper  Anesthesia type Topical  Pre op testing date   Clear patient in note of forms will be faxed to [facility] of contact in specialty office:   Name [facility]    phone [phone] ext [phone]    fax [phone]

## 2022-03-30 NOTE — TELEPHONE ENCOUNTER
NASEEM advising Caitlyn Reyes of patient's pre op appointment on 5/31/22 @ 10:30. She was asked to fax copy of pre op form to [facility].

## 2022-04-21 DIAGNOSIS — E78.5 HYPERLIPIDEMIA WITH TARGET LDL LESS THAN 130: ICD-10-CM

## 2022-04-21 DIAGNOSIS — F41.9 ANXIETY: ICD-10-CM

## 2022-04-21 RX ORDER — ATORVASTATIN CALCIUM 40 MG/1
TABLET, FILM COATED ORAL
Qty: 90 TABLET | Refills: 0 | Status: SHIPPED | OUTPATIENT
Start: 2022-04-21 | End: 2022-07-20 | Stop reason: SDUPTHER

## 2022-04-21 RX ORDER — VENLAFAXINE HYDROCHLORIDE 75 MG/1
CAPSULE, EXTENDED RELEASE ORAL
Qty: 90 CAPSULE | Refills: 0 | Status: SHIPPED | OUTPATIENT
Start: 2022-04-21 | End: 2022-07-20 | Stop reason: SDUPTHER

## 2022-05-31 ENCOUNTER — OFFICE VISIT (OUTPATIENT)
Dept: FAMILY MEDICINE CLINIC | Age: 67
End: 2022-05-31
Payer: MEDICARE

## 2022-05-31 VITALS
HEIGHT: 63 IN | TEMPERATURE: 98 F | HEART RATE: 71 BPM | DIASTOLIC BLOOD PRESSURE: 74 MMHG | OXYGEN SATURATION: 96 % | RESPIRATION RATE: 16 BRPM | BODY MASS INDEX: 29.77 KG/M2 | SYSTOLIC BLOOD PRESSURE: 110 MMHG | WEIGHT: 168 LBS

## 2022-05-31 VITALS
RESPIRATION RATE: 16 BRPM | OXYGEN SATURATION: 96 % | DIASTOLIC BLOOD PRESSURE: 74 MMHG | HEART RATE: 71 BPM | TEMPERATURE: 98 F | WEIGHT: 168 LBS | SYSTOLIC BLOOD PRESSURE: 110 MMHG | HEIGHT: 63 IN | BODY MASS INDEX: 29.77 KG/M2

## 2022-05-31 DIAGNOSIS — Z78.0 POSTMENOPAUSAL STATE: ICD-10-CM

## 2022-05-31 DIAGNOSIS — F41.9 ANXIETY: ICD-10-CM

## 2022-05-31 DIAGNOSIS — E78.5 HYPERLIPIDEMIA WITH TARGET LDL LESS THAN 130: ICD-10-CM

## 2022-05-31 DIAGNOSIS — G43.809 OTHER MIGRAINE WITHOUT STATUS MIGRAINOSUS, NOT INTRACTABLE: ICD-10-CM

## 2022-05-31 DIAGNOSIS — Z00.00 WELCOME TO MEDICARE PREVENTIVE VISIT: Primary | ICD-10-CM

## 2022-05-31 DIAGNOSIS — R73.01 IFG (IMPAIRED FASTING GLUCOSE): ICD-10-CM

## 2022-05-31 DIAGNOSIS — H26.9 CATARACT OF BOTH EYES, UNSPECIFIED CATARACT TYPE: Primary | ICD-10-CM

## 2022-05-31 DIAGNOSIS — E55.9 VITAMIN D DEFICIENCY: ICD-10-CM

## 2022-05-31 DIAGNOSIS — Z12.31 ENCOUNTER FOR SCREENING MAMMOGRAM FOR MALIGNANT NEOPLASM OF BREAST: ICD-10-CM

## 2022-05-31 DIAGNOSIS — Z71.89 ADVANCED DIRECTIVES, COUNSELING/DISCUSSION: ICD-10-CM

## 2022-05-31 DIAGNOSIS — Z23 ENCOUNTER FOR IMMUNIZATION: ICD-10-CM

## 2022-05-31 DIAGNOSIS — K76.0 FATTY LIVER: ICD-10-CM

## 2022-05-31 LAB — HBA1C MFR BLD HPLC: 6.2 %

## 2022-05-31 PROCEDURE — G8510 SCR DEP NEG, NO PLAN REQD: HCPCS | Performed by: FAMILY MEDICINE

## 2022-05-31 PROCEDURE — G8417 CALC BMI ABV UP PARAM F/U: HCPCS | Performed by: FAMILY MEDICINE

## 2022-05-31 PROCEDURE — G0402 INITIAL PREVENTIVE EXAM: HCPCS | Performed by: FAMILY MEDICINE

## 2022-05-31 PROCEDURE — 90471 IMMUNIZATION ADMIN: CPT | Performed by: FAMILY MEDICINE

## 2022-05-31 PROCEDURE — 1123F ACP DISCUSS/DSCN MKR DOCD: CPT | Performed by: FAMILY MEDICINE

## 2022-05-31 PROCEDURE — G8427 DOCREV CUR MEDS BY ELIG CLIN: HCPCS | Performed by: FAMILY MEDICINE

## 2022-05-31 PROCEDURE — 1090F PRES/ABSN URINE INCON ASSESS: CPT | Performed by: FAMILY MEDICINE

## 2022-05-31 PROCEDURE — 90677 PCV20 VACCINE IM: CPT | Performed by: FAMILY MEDICINE

## 2022-05-31 PROCEDURE — G8536 NO DOC ELDER MAL SCRN: HCPCS | Performed by: FAMILY MEDICINE

## 2022-05-31 PROCEDURE — 99214 OFFICE O/P EST MOD 30 MIN: CPT | Performed by: FAMILY MEDICINE

## 2022-05-31 PROCEDURE — G9899 SCRN MAM PERF RSLTS DOC: HCPCS | Performed by: FAMILY MEDICINE

## 2022-05-31 PROCEDURE — 3017F COLORECTAL CA SCREEN DOC REV: CPT | Performed by: FAMILY MEDICINE

## 2022-05-31 PROCEDURE — 1101F PT FALLS ASSESS-DOCD LE1/YR: CPT | Performed by: FAMILY MEDICINE

## 2022-05-31 PROCEDURE — 83036 HEMOGLOBIN GLYCOSYLATED A1C: CPT | Performed by: FAMILY MEDICINE

## 2022-05-31 PROCEDURE — G0463 HOSPITAL OUTPT CLINIC VISIT: HCPCS | Performed by: FAMILY MEDICINE

## 2022-05-31 PROCEDURE — G8399 PT W/DXA RESULTS DOCUMENT: HCPCS | Performed by: FAMILY MEDICINE

## 2022-05-31 RX ORDER — KETOROLAC TROMETHAMINE 5 MG/ML
2 SOLUTION OPHTHALMIC 2 TIMES DAILY
COMMUNITY
Start: 2022-04-01 | End: 2022-10-03

## 2022-05-31 NOTE — PATIENT INSTRUCTIONS
Medicare Wellness Visit, Female     The best way to live healthy is to have a lifestyle where you eat a well-balanced diet, exercise regularly, limit alcohol use, and quit all forms of tobacco/nicotine, if applicable. Regular preventive services are another way to keep healthy. Preventive services (vaccines, screening tests, monitoring & exams) can help personalize your care plan, which helps you manage your own care. Screening tests can find health problems at the earliest stages, when they are easiest to treat. [facility] follows the current, evidence-based guidelines published by the [facility] Darshan Mauro ([facility]) when recommending preventive services for our patients. Because we follow these guidelines, sometimes recommendations change over time as research supports it. (For example, mammograms used to be recommended annually. Even though Medicare will still pay for an annual mammogram, the newer guidelines recommend a mammogram every two years for women of average risk). Of course, you and your doctor may decide to screen more often for some diseases, based on your risk and your co-morbidities (chronic disease you are already diagnosed with). Preventive services for you include:  - Medicare offers their members a free annual wellness visit, which is time for you and your primary care provider to discuss and plan for your preventive service needs. Take advantage of this benefit every year!  -All adults over the age of 72 should receive the recommended pneumonia vaccines. Current USPSTF guidelines recommend a series of two vaccines for the best pneumonia protection.   -All adults should have a flu vaccine yearly and a tetanus vaccine every 10 years.   -All adults age 48 and older should receive the shingles vaccines (series of two vaccines).       -All adults age 38-68 who are overweight should have a diabetes screening test once every three years.   -All adults born between [birth year] and 1964 should be screened once for Hepatitis C.  -Other screening tests and preventive services for persons with diabetes include: an eye exam to screen for diabetic retinopathy, a kidney function test, a foot exam, and stricter control over your cholesterol.   -Cardiovascular screening for adults with routine risk involves an electrocardiogram (ECG) at intervals determined by your doctor.   -Colorectal cancer screenings should be done for adults age 54-65 with no increased risk factors for colorectal cancer. There are a number of acceptable methods of screening for this type of cancer. Each test has its own benefits and drawbacks. Discuss with your doctor what is most appropriate for you during your annual wellness visit. The different tests include: colonoscopy (considered the best screening method), a fecal occult blood test, a fecal DNA test, and sigmoidoscopy.    -A bone mass density test is recommended when a woman turns 65 to screen for osteoporosis. This test is only recommended one time, as a screening. Some providers will use this same test as a disease monitoring tool if you already have osteoporosis. -Breast cancer screenings are recommended every other year for women of normal risk, age 54-69.  -Cervical cancer screenings for women over age 72 are only recommended with certain risk factors.      Here is a list of your current Health Maintenance items (your personalized list of preventive services) with a due date:  Health Maintenance Due   Topic Date Due    Shingles Vaccine (1 of 2) Never done    COVID-19 Vaccine (3 - Booster for Moderna series) 09/23/2021    Bone Mineral Density   10/15/2021    Pneumococcal Vaccine (1 - PCV) Never done    Mammogram  10/26/2021

## 2022-05-31 NOTE — PROGRESS NOTES
Chief Complaint   Patient presents with    Annual Wellness Visit     Abuse Screening Questionnaire 5/31/2022   Do you ever feel afraid of your partner? N   Are you in a relationship with someone who physically or mentally threatens you? N   Is it safe for you to go home? Y     Fall Risk Assessment, last 12 mths 5/31/2022   Able to walk? Yes   Fall in past 12 months? 0   Do you feel unsteady? 0   Are you worried about falling 0   Is the gait abnormal? -   Number of falls in past 12 months -   Fall with injury? -         3 most recent PHQ Screens 5/31/2022   PHQ Not Done -   Little interest or pleasure in doing things Not at all   Feeling down, depressed, irritable, or hopeless Not at all   Total Score PHQ 2 0   Trouble falling or staying asleep, or sleeping too much Not at all   Feeling tired or having little energy Not at all   Poor appetite, weight loss, or overeating Not at all   Feeling bad about yourself - or that you are a failure or have let yourself or your family down Not at all   Trouble concentrating on things such as school, work, reading, or watching TV Not at all   Moving or speaking so slowly that other people could have noticed; or the opposite being so fidgety that others notice Not at all   Thoughts of being better off dead, or hurting yourself in some way Not at all   PHQ 9 Score 0   How difficult have these problems made it for you to do your work, take care of your home and get along with others Not difficult at all     Physician order obtained. Patient completed adult immunization consent form. Allergies, contraindications and recommendations reviewed with patient. Prevnar 20 vaccine administered IM left deltoid. Patient tolerated well. Patient remained in office for 15 minutes after injection and no adverse reactions were noted.     Lot # [id number]  Exp Date: 07/01/2023  [facility] # [id number]

## 2022-05-31 NOTE — PROGRESS NOTES
SUBJECTIVE  Chief Complaint   Patient presents with    Pre-op Exam     Right eye cataract removal to be performed by Dr. Dom Lind Cholesterol Problem     elevated lipids     Migraine    Vitamin D Deficiency      Follow-up for lab review for hypercholesterolemia, anxiety, fatty liver, and hypovitaminosis D. Overdue by 3 months. Sent here for pre-op for cataract surgery - paperwork filled out. Hypercholesterolemia - Taking Lipitor 40mg with no side effects however may forget doses. No myalgias or cramping reported. No chest pain or dyspnea. The patient's anxiety is controlled on Effexor. Takes Zomig as needed for rare migraines. Has been less active due to msk foot pains. Was seeing podiatry. OBJECTIVE    Blood pressure 110/74, pulse 71, temperature 98 °F (36.7 °C), temperature source Temporal, resp. rate 16, height 5' 3\" (1.6 m), weight 168 lb (76.2 kg), SpO2 96 %. General:  Alert, cooperative, well appearing, in no apparent distress. Chest: clear, no w/r/r. Heart: normal s1s2, RRR, no murmurs. Abd: benign. Ext: no swelling present. Psych: normal affect. Mood good. Oriented x 3. Judgement and insight intact. Results for orders placed or performed in visit on 05/31/22   AMB POC HEMOGLOBIN A1C   Result Value Ref Range    Hemoglobin A1c (POC) 6.2 %     ASSESSMENT / PLAN    ICD-10-CM ICD-9-CM    1. Cataract of both eyes, unspecified cataract type  H26.9 366.9    2. IFG (impaired fasting glucose)  R73.01 790.21 AMB POC HEMOGLOBIN A1C      HEMOGLOBIN A1C W/O EAG   3. Hyperlipidemia with target LDL less than 130  E78.5 272.4 CBC WITH AUTOMATED DIFF      METABOLIC PANEL, COMPREHENSIVE      LIPID PANEL   4. Anxiety  F41.9 300.00    5. Vitamin D deficiency  E55.9 268.9 VITAMIN D, 25 HYDROXY   6. Fatty liver  Y20.4 668.5 METABOLIC PANEL, COMPREHENSIVE   7.  Other migraine without status migrainosus, not intractable  G43.809 346.80      Cataracts - reviewed ophthalmology notes. Proceed with surgery. Pre-op forms filled out. Will have to [facility] in Aug for additional pre-op according to patient. IFG - reviewed A1c on the rise. Advised on exercise and carb reduction. I will recheck this in 3 months with her annual labs. Hyperlipidemia - Uncontrolled due to intermittent compliance in the past / missing doses occasionally. Continue Lipitor 40mg nightly. No hepatotoxicity. Cont diet and exercise. Anxiety - Controlled. Continue Effexor. Refills as needed. Vit D def - controlled on vitamin D 50,000 weekly. Check levels with annual labs. Fatty liver -  Cont diet and exercise. Track LFTs annually at least.    Migraines - Zomig as needed for migraines. Advised on importance of CPE / well woman exam with OB/GYN. Ordered bone density and mammogram since she is overdue. All chart history elements were reviewed by me at the time of the visit even though marked at time of note closure. Patient understands our medical plan. Patient has provided input and agrees with goals. Alternatives have been explained and offered. All questions answered. The patient is to call if condition worsens or fails to improve. Follow-up and Dispositions    · Return in about 9 weeks (around 8/2/2022).

## 2022-05-31 NOTE — PATIENT INSTRUCTIONS
A Healthy Lifestyle: Care Instructions  Your Care Instructions     A healthy lifestyle can help you feel good, stay at a healthy weight, and have plenty of energy for both work and play. A healthy lifestyle is something you can share with your whole family. A healthy lifestyle also can lower your risk for serious health problems, such as high blood pressure, heart disease, and diabetes. You can follow a few steps listed below to improve your health and the health of your family. Follow-up care is a key part of your treatment and safety. Be sure to make and go to all appointments, and call your doctor if you are having problems. It's also a good idea to know your test results and keep a list of the medicines you take. How can you care for yourself at home? · Do not eat too much sugar, fat, or fast foods. You can still have dessert and treats now and then. The goal is moderation. · Start small to improve your eating habits. Pay attention to portion sizes, drink less juice and soda pop, and eat more fruits and vegetables. ? Eat a healthy amount of food. A 3-ounce serving of meat, for example, is about the size of a deck of cards. Fill the rest of your plate with vegetables and whole grains. ? Limit the amount of soda and sports drinks you have every day. Drink more water when you are thirsty. ? Eat plenty of fruits and vegetables every day. Have an apple or some carrot sticks as an afternoon snack instead of a candy bar. Try to have fruits and/or vegetables at every meal.  · Make exercise part of your daily routine. You may want to start with simple activities, such as walking, bicycling, or slow swimming. Try to be active 30 to 60 minutes every day. You do not need to do all 30 to 60 minutes all at once. For example, you can exercise 3 times a day for 10 or 20 minutes.  Moderate exercise is safe for most people, but it is always a good idea to talk to your doctor before starting an exercise program.  · Keep moving. [place] the lawn, work in the garden, or [employer]. Take the stairs instead of the elevator at work. · If you smoke, quit. People who smoke have an increased risk for heart attack, stroke, cancer, and other lung illnesses. Quitting is hard, but there are ways to boost your chance of quitting tobacco for good. ? Use nicotine gum, patches, or lozenges. ? Ask your doctor about stop-smoking programs and medicines. ? Keep trying. In addition to reducing your risk of diseases in the future, you will notice some benefits soon after you stop using tobacco. If you have shortness of breath or asthma symptoms, they will likely get better within a few weeks after you quit. · Limit how much alcohol you drink. Moderate amounts of alcohol (up to 2 drinks a day for men, 1 drink a day for women) are okay. But drinking too much can lead to liver problems, high blood pressure, and other health problems. Family health  If you have a family, there are many things you can do together to improve your health. · Eat meals together as a family as often as possible. · Eat healthy foods. This includes fruits, vegetables, lean meats and dairy, and whole grains. · Include your family in your fitness plan. Most people think of activities such as jogging or tennis as the way to fitness, but there are many ways you and your family can be more active. Anything that makes you breathe hard and gets your heart pumping is exercise. Here are some tips:  ? Walk to do errands or to take your child to school or the bus.  ? Go for a family bike ride after dinner instead of watching TV. Where can you learn more? Go to http://www.gray.com/  Enter L461 in the search box to learn more about \"A Healthy Lifestyle: Care Instructions. \"  Current as of: June 16, 2021               Content Version: 13.2  © 6340-3353 Healthwise, Incorporated.    Care instructions adapted under license by [employer] (which disclaims liability or warranty for this information). If you have questions about a medical condition or this instruction, always ask your healthcare professional. [address] any warranty or liability for your use of this information.

## 2022-05-31 NOTE — PROGRESS NOTES
Chief Complaint   Patient presents with    Annual Wellness Visit     This is a \"Welcome to [employer]"  Initial Preventive Physical Examination (IPPE) providing Personalized Prevention Plan Services (Performed in the first 12 months of enrollment)    I have reviewed the patient's medical history in detail and updated the computerized patient record. Assessment/Plan   Education and counseling provided:  Are appropriate based on today's review and evaluation      ICD-10-CM ICD-9-CM    1. Welcome to Medicare preventive visit  Z00.00 V70.0    2. Advanced directives, counseling/discussion  Z71.89 V65.49    3. Encounter for screening mammogram for malignant neoplasm of breast  Z12.31 V76.12 GIOVANY MAMMO BI SCREENING INCL CAD   4. Postmenopausal state  Z78.0 V49.81 DEXA BONE DENSITY STUDY AXIAL   5. Encounter for immunization  Z23 V03.89 PNEUMOCOCCAL CONJUGATE PCV20, PF (PREVNAR 21)      MI IMMUNIZ ADMIN,1 SINGLE/COMB VAC/TOXOID     Age and sex specific counseling. Screening for depression and alcoholism. Advanced directives / end of life planning discussion - see ACP notes. Care team updated. Vaccines are not up to date. Patient advised to get shingles vaccine and covid booster. Administered PCV 20 today. Medicare recommended screening and prevention test table is filled out, reviewed, and provided to patient. Scanned to chart as well. Screening and prevention is up to date. Patient understands our medical plan. Patient has provided input and agrees with goals. All questions answered.      Depression Risk Screen     3 most recent PHQ Screens 5/31/2022   PHQ Not Done -   Little interest or pleasure in doing things Not at all   Feeling down, depressed, irritable, or hopeless Not at all   Total Score PHQ 2 0   Trouble falling or staying asleep, or sleeping too much Not at all   Feeling tired or having little energy Not at all   Poor appetite, weight loss, or overeating Not at all   Feeling bad about yourself - or that you are a failure or have let yourself or your family down Not at all   Trouble concentrating on things such as school, work, reading, or watching TV Not at all   Moving or speaking so slowly that other people could have noticed; or the opposite being so fidgety that others notice Not at all   Thoughts of being better off dead, or hurting yourself in some way Not at all   PHQ 9 Score 0   How difficult have these problems made it for you to do your work, take care of your home and get along with others Not difficult at all       Alcohol & Drug Abuse Risk Screen    Do you average more than 1 drink per night or more than 7 drinks a week:  No    On any one occasion in the past three months have you have had more than 3 drinks containing alcohol:  No          Functional Ability and Level of Safety    Diet: No special diet      Hearing: Hearing is good. Vision Screening:  Vision is good. seeing eye doctor for cataract removal bilaterally this summer      Activities of Daily Living: The home contains: no safety equipment. Patient does total self care      Ambulation: with no difficulty      Exercise level: sedentary     Fall Risk Screen:  Fall Risk Assessment, last 12 mths 5/31/2022   Able to walk? Yes   Fall in past 12 months? 0   Do you feel unsteady? 0   Are you worried about falling 0   Is the gait abnormal? -   Number of falls in past 12 months -   Fall with injury? -      Abuse Screen:  Patient is not abused     Screening EKG   EKG order placed: No  Has had this in 2018 and 2d ECHO recently in 2021. End of Life Planning   Advanced care planning directives were discussed with the patient and /or family/caregiver.      Health Maintenance Due     Health Maintenance Due   Topic Date Due    Shingrix Vaccine Age 49> (1 of 2) Never done    COVID-19 Vaccine (3 - Booster for Moderna series) 09/23/2021    Bone Densitometry (Dexa) Screening  10/15/2021    Pneumococcal 65+ years (1 - PCV) Never done    Breast Cancer Screen Mammogram  10/26/2021       Patient Care Team   Patient Care Team:  Heri Taylor MD as PCP - General (Family Medicine)  Heri Taylor MD as PCP - [facility] Provider  Kori Winters MD (Gastroenterology)  Roma Hernandez, ZHAO    History     Past Medical History:   Diagnosis Date    Arthritis of left hip 02/2021    x-ray     Elevated blood pressure reading without diagnosis of hypertension     Fatty liver     sees GI    Fibroadenoma of right breast 8/2013    rpt US 3/14    First degree AV block 4/24/2018    Generalized anxiety disorder     H/O colonoscopy with polypectomy 08/07/2020    Dr. Liza Aguiar; polyp (hyperplastic) and moderate diverticulosis; colonoscopy in 5 years    High cholesterol     Hx of colonoscopy 09/12/2012    Dr. Liza Aguiar, due 9/2017, 8mm polyp    Liver cyst     sees GI    Migraine without aura, without mention of intractable migraine without mention of status migrainosus     Osteopenia     Other and unspecified hyperlipidemia       History reviewed. No pertinent surgical history. Current Outpatient Medications   Medication Sig Dispense Refill    ketorolac (ACULAR) 0.5 % ophthalmic solution Administer 2 Drops to right eye two (2) times a day.  atorvastatin (LIPITOR) 40 mg tablet TAKE 1 TABLET NIGHTLY 90 Tablet 0    venlafaxine-SR (EFFEXOR-XR) 75 mg capsule TAKE 1 CAPSULE DAILY 90 Capsule 0    ZOLMitriptan (ZOMIG) 2.5 mg tablet Take 1 Tab by mouth as needed for Migraine. 18 Tab 3    MULTIVITAMIN PO Take 1 Tab by mouth daily.       Vitamin D2 1,250 mcg (50,000 unit) capsule TAKE 1 CAPSULE EVERY 7 DAYS 12 Capsule 3     No Known Allergies    Family History   Problem Relation Age of Onset    Hypertension Father     High Cholesterol Father     Cancer Mother         brain    Osteoporosis Mother     Stroke Mother     Other Mother         skin ca - basal cell    Diabetes Paternal Grandfather     Heart Attack Paternal Lylia Lo Stroke Paternal Grandmother      Social History     Tobacco Use    Smoking status: Never Smoker    Smokeless tobacco: Never Used   Substance Use Topics    Alcohol use:  Yes     Alcohol/week: 1.7 standard drinks     Types: 2 Glasses of wine per week     Comment: Graciela Allan MD

## 2022-05-31 NOTE — ACP (ADVANCE CARE PLANNING)
Patient unsure if she has an advanced care plan or living will. Revisit next year at [facility]

## 2022-06-09 ENCOUNTER — HOSPITAL ENCOUNTER (OUTPATIENT)
Dept: BONE DENSITY | Age: 67
Discharge: HOME | End: 2022-06-09
Attending: FAMILY MEDICINE
Payer: MEDICARE

## 2022-06-09 ENCOUNTER — HOSPITAL ENCOUNTER (OUTPATIENT)
Dept: MAMMOGRAPHY | Age: 67
Discharge: HOME | End: 2022-06-09
Attending: FAMILY MEDICINE
Payer: MEDICARE

## 2022-06-09 DIAGNOSIS — Z12.31 ENCOUNTER FOR SCREENING MAMMOGRAM FOR MALIGNANT NEOPLASM OF BREAST: ICD-10-CM

## 2022-06-09 DIAGNOSIS — Z78.0 POSTMENOPAUSAL STATE: ICD-10-CM

## 2022-06-09 PROCEDURE — 77063 BREAST TOMOSYNTHESIS BI: CPT

## 2022-06-09 PROCEDURE — 77080 DXA BONE DENSITY AXIAL: CPT

## 2022-07-20 DIAGNOSIS — E55.9 VITAMIN D DEFICIENCY: ICD-10-CM

## 2022-07-20 DIAGNOSIS — E78.5 HYPERLIPIDEMIA WITH TARGET LDL LESS THAN 130: ICD-10-CM

## 2022-07-20 DIAGNOSIS — F41.9 ANXIETY: ICD-10-CM

## 2022-07-20 RX ORDER — ERGOCALCIFEROL 1.25 MG/1
50000 CAPSULE ORAL
Qty: 12 CAPSULE | Refills: 3 | Status: SHIPPED | OUTPATIENT
Start: 2022-07-20

## 2022-07-20 RX ORDER — VENLAFAXINE HYDROCHLORIDE 75 MG/1
75 CAPSULE, EXTENDED RELEASE ORAL DAILY
Qty: 90 CAPSULE | Refills: 0 | Status: SHIPPED | OUTPATIENT
Start: 2022-07-20 | End: 2022-10-20

## 2022-07-20 RX ORDER — ATORVASTATIN CALCIUM 40 MG/1
40 TABLET, FILM COATED ORAL
Qty: 90 TABLET | Refills: 0 | Status: SHIPPED | OUTPATIENT
Start: 2022-07-20 | End: 2022-10-20

## 2022-07-25 ENCOUNTER — APPOINTMENT (OUTPATIENT)
Age: 67
End: 2022-07-25
Attending: STUDENT IN AN ORGANIZED HEALTH CARE EDUCATION/TRAINING PROGRAM
Payer: MEDICARE

## 2022-07-25 ENCOUNTER — APPOINTMENT (OUTPATIENT)
Dept: CT IMAGING | Age: 67
End: 2022-07-25
Attending: STUDENT IN AN ORGANIZED HEALTH CARE EDUCATION/TRAINING PROGRAM
Payer: MEDICARE

## 2022-07-25 ENCOUNTER — HOSPITAL ENCOUNTER (EMERGENCY)
Age: 67
Discharge: HOME | End: 2022-07-25
Attending: STUDENT IN AN ORGANIZED HEALTH CARE EDUCATION/TRAINING PROGRAM
Payer: MEDICARE

## 2022-07-25 VITALS
RESPIRATION RATE: 17 BRPM | DIASTOLIC BLOOD PRESSURE: 77 MMHG | SYSTOLIC BLOOD PRESSURE: 160 MMHG | TEMPERATURE: 98.3 F | HEART RATE: 89 BPM | WEIGHT: 150 LBS | OXYGEN SATURATION: 96 % | BODY MASS INDEX: 26.58 KG/M2 | HEIGHT: 63 IN

## 2022-07-25 DIAGNOSIS — F43.9: Primary | ICD-10-CM

## 2022-07-25 DIAGNOSIS — F44.1: Primary | ICD-10-CM

## 2022-07-25 LAB
ALBUMIN SERPL-MCNC: 3.9 G/DL (ref 3.4–5)
ALBUMIN/GLOB SERPL: 1 {RATIO} (ref 0.8–1.7)
ALP SERPL-CCNC: 83 U/L (ref 45–117)
ALT SERPL-CCNC: 45 U/L (ref 13–56)
AMPHET UR QL SCN: NEGATIVE
ANION GAP SERPL CALC-SCNC: 8 MMOL/L (ref 3–18)
APPEARANCE UR: CLEAR
AST SERPL-CCNC: 24 U/L (ref 10–38)
BARBITURATES UR QL SCN: NEGATIVE
BASOPHILS # BLD: 0.1 K/UL (ref 0–0.1)
BASOPHILS NFR BLD: 1 % (ref 0–2)
BENZODIAZ UR QL: NEGATIVE
BILIRUB SERPL-MCNC: 0.5 MG/DL (ref 0.2–1)
BILIRUB UR QL: NEGATIVE
BUN SERPL-MCNC: 13 MG/DL (ref 7–18)
BUN/CREAT SERPL: 19 (ref 12–20)
CALCIUM SERPL-MCNC: 9.6 MG/DL (ref 8.5–10.1)
CANNABINOIDS UR QL SCN: NEGATIVE
CHLORIDE SERPL-SCNC: 107 MMOL/L (ref 100–111)
CO2 SERPL-SCNC: 29 MMOL/L (ref 21–32)
COCAINE UR QL SCN: NEGATIVE
COLOR UR: YELLOW
CREAT SERPL-MCNC: 0.67 MG/DL (ref 0.6–1.3)
DIFFERENTIAL METHOD BLD: NORMAL
EOSINOPHIL # BLD: 0.4 K/UL (ref 0–0.4)
EOSINOPHIL NFR BLD: 5 % (ref 0–5)
EPITH CASTS URNS QL MICRO: ABNORMAL /LPF (ref 0–5)
ERYTHROCYTE [DISTWIDTH] IN BLOOD BY AUTOMATED COUNT: 13.1 % (ref 11.6–14.5)
ETHANOL SERPL-MCNC: <3 MG/DL (ref 0–3)
GLOBULIN SER CALC-MCNC: 3.8 G/DL (ref 2–4)
GLUCOSE SERPL-MCNC: 110 MG/DL (ref 74–99)
GLUCOSE UR STRIP.AUTO-MCNC: NEGATIVE MG/DL
HCT VFR BLD AUTO: 44.5 % (ref 35–45)
HDSCOM,HDSCOM: NORMAL
HGB BLD-MCNC: 14.6 G/DL (ref 12–16)
HGB UR QL STRIP: NEGATIVE
HYALINE CASTS URNS QL MICRO: ABNORMAL /LPF (ref 0–2)
IMM GRANULOCYTES # BLD AUTO: 0 K/UL (ref 0–0.04)
IMM GRANULOCYTES NFR BLD AUTO: 0 % (ref 0–0.5)
KETONES UR QL STRIP.AUTO: NEGATIVE MG/DL
LEUKOCYTE ESTERASE UR QL STRIP.AUTO: ABNORMAL
LYMPHOCYTES # BLD: 3.6 K/UL (ref 0.9–3.6)
LYMPHOCYTES NFR BLD: 41 % (ref 21–52)
MAGNESIUM SERPL-MCNC: 2.4 MG/DL (ref 1.6–2.6)
MCH RBC QN AUTO: 28.4 PG (ref 24–34)
MCHC RBC AUTO-ENTMCNC: 32.8 G/DL (ref 31–37)
MCV RBC AUTO: 86.6 FL (ref 78–100)
METHADONE UR QL: NEGATIVE
MONOCYTES # BLD: 0.7 K/UL (ref 0.05–1.2)
MONOCYTES NFR BLD: 8 % (ref 3–10)
MUCOUS THREADS URNS QL MICRO: ABNORMAL /LPF
NEUTS SEG # BLD: 4 K/UL (ref 1.8–8)
NEUTS SEG NFR BLD: 46 % (ref 40–73)
NITRITE UR QL STRIP.AUTO: NEGATIVE
NRBC # BLD: 0 K/UL (ref 0–0.01)
NRBC BLD-RTO: 0 PER 100 WBC
OPIATES UR QL: NEGATIVE
PCP UR QL: NEGATIVE
PH UR STRIP: 5 [PH] (ref 5–8)
PLATELET # BLD AUTO: 337 K/UL (ref 135–420)
PMV BLD AUTO: 10.6 FL (ref 9.2–11.8)
POTASSIUM SERPL-SCNC: 4.6 MMOL/L (ref 3.5–5.5)
PROT SERPL-MCNC: 7.7 G/DL (ref 6.4–8.2)
PROT UR STRIP-MCNC: NEGATIVE MG/DL
RBC # BLD AUTO: 5.14 M/UL (ref 4.2–5.3)
RBC #/AREA URNS HPF: ABNORMAL /HPF (ref 0–5)
SODIUM SERPL-SCNC: 144 MMOL/L (ref 136–145)
SP GR UR REFRACTOMETRY: 1.02 (ref 1–1.03)
TROPONIN-HIGH SENSITIVITY: 3 NG/L (ref 0–54)
UROBILINOGEN UR QL STRIP.AUTO: 1 EU/DL (ref 0.2–1)
WBC # BLD AUTO: 8.8 K/UL (ref 4.6–13.2)
WBC URNS QL MICRO: ABNORMAL /HPF (ref 0–4)

## 2022-07-25 PROCEDURE — 74011250636 HC RX REV CODE- 250/636: Performed by: STUDENT IN AN ORGANIZED HEALTH CARE EDUCATION/TRAINING PROGRAM

## 2022-07-25 PROCEDURE — A9575 INJ GADOTERATE MEGLUMI 0.1ML: HCPCS | Performed by: STUDENT IN AN ORGANIZED HEALTH CARE EDUCATION/TRAINING PROGRAM

## 2022-07-25 PROCEDURE — 84484 ASSAY OF TROPONIN QUANT: CPT

## 2022-07-25 PROCEDURE — 80307 DRUG TEST PRSMV CHEM ANLYZR: CPT

## 2022-07-25 PROCEDURE — 80053 COMPREHEN METABOLIC PANEL: CPT

## 2022-07-25 PROCEDURE — 82077 ASSAY SPEC XCP UR&BREATH IA: CPT

## 2022-07-25 PROCEDURE — 99285 EMERGENCY DEPT VISIT HI MDM: CPT

## 2022-07-25 PROCEDURE — 81001 URINALYSIS AUTO W/SCOPE: CPT

## 2022-07-25 PROCEDURE — 70450 CT HEAD/BRAIN W/O DYE: CPT

## 2022-07-25 PROCEDURE — 70553 MRI BRAIN STEM W/O & W/DYE: CPT

## 2022-07-25 PROCEDURE — 93005 ELECTROCARDIOGRAM TRACING: CPT

## 2022-07-25 PROCEDURE — 83735 ASSAY OF MAGNESIUM: CPT

## 2022-07-25 PROCEDURE — 85025 COMPLETE CBC W/AUTO DIFF WBC: CPT

## 2022-07-25 RX ADMIN — GADOTERATE MEGLUMINE 15 ML: 376.9 INJECTION INTRAVENOUS at 11:11

## 2022-07-25 NOTE — ED NOTES
Rounded on pt, blanket given, side rails X2, bed within lowest position, call bell within reach, will continue to monitor pt closely.
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
## 2022-07-26 ENCOUNTER — PATIENT MESSAGE (OUTPATIENT)
Dept: FAMILY MEDICINE CLINIC | Age: 67
End: 2022-07-26

## 2022-07-26 LAB
ATRIAL RATE: 86 BPM
CALCULATED P AXIS, ECG09: 64 DEGREES
CALCULATED R AXIS, ECG10: 2 DEGREES
CALCULATED T AXIS, ECG11: 63 DEGREES
DIAGNOSIS, 93000: NORMAL
P-R INTERVAL, ECG05: 198 MS
Q-T INTERVAL, ECG07: 366 MS
QRS DURATION, ECG06: 78 MS
QTC CALCULATION (BEZET), ECG08: 437 MS
VENTRICULAR RATE, ECG03: 86 BPM

## 2022-07-28 ENCOUNTER — HOSPITAL ENCOUNTER (OUTPATIENT)
Dept: LAB | Age: 67
Discharge: HOME | End: 2022-07-28
Payer: MEDICARE

## 2022-07-28 DIAGNOSIS — R73.01 IFG (IMPAIRED FASTING GLUCOSE): ICD-10-CM

## 2022-07-28 DIAGNOSIS — E55.9 VITAMIN D DEFICIENCY: ICD-10-CM

## 2022-07-28 DIAGNOSIS — K76.0 FATTY LIVER: ICD-10-CM

## 2022-07-28 DIAGNOSIS — E78.5 HYPERLIPIDEMIA WITH TARGET LDL LESS THAN 130: ICD-10-CM

## 2022-07-28 LAB
25(OH)D3 SERPL-MCNC: 77.4 NG/ML (ref 30–100)
ALBUMIN SERPL-MCNC: 4 G/DL (ref 3.4–5)
ALBUMIN/GLOB SERPL: 1.1 {RATIO} (ref 0.8–1.7)
ALP SERPL-CCNC: 81 U/L (ref 45–117)
ALT SERPL-CCNC: 46 U/L (ref 13–56)
ANION GAP SERPL CALC-SCNC: 4 MMOL/L (ref 3–18)
AST SERPL-CCNC: 29 U/L (ref 10–38)
BASOPHILS # BLD: 0.1 K/UL (ref 0–0.1)
BASOPHILS NFR BLD: 1 % (ref 0–2)
BILIRUB SERPL-MCNC: 0.7 MG/DL (ref 0.2–1)
BUN SERPL-MCNC: 15 MG/DL (ref 7–18)
BUN/CREAT SERPL: 20 (ref 12–20)
CALCIUM SERPL-MCNC: 9.3 MG/DL (ref 8.5–10.1)
CHLORIDE SERPL-SCNC: 107 MMOL/L (ref 100–111)
CHOLEST SERPL-MCNC: 183 MG/DL
CO2 SERPL-SCNC: 29 MMOL/L (ref 21–32)
CREAT SERPL-MCNC: 0.76 MG/DL (ref 0.6–1.3)
DIFFERENTIAL METHOD BLD: NORMAL
EOSINOPHIL # BLD: 0.4 K/UL (ref 0–0.4)
EOSINOPHIL NFR BLD: 5 % (ref 0–5)
ERYTHROCYTE [DISTWIDTH] IN BLOOD BY AUTOMATED COUNT: 13.2 % (ref 11.6–14.5)
GLOBULIN SER CALC-MCNC: 3.7 G/DL (ref 2–4)
GLUCOSE SERPL-MCNC: 105 MG/DL (ref 74–99)
HBA1C MFR BLD: 6.3 % (ref 4.2–5.6)
HCT VFR BLD AUTO: 42.2 % (ref 35–45)
HDLC SERPL-MCNC: 50 MG/DL (ref 40–60)
HDLC SERPL: 3.7 {RATIO} (ref 0–5)
HGB BLD-MCNC: 13.9 G/DL (ref 12–16)
IMM GRANULOCYTES # BLD AUTO: 0 K/UL (ref 0–0.04)
IMM GRANULOCYTES NFR BLD AUTO: 0 % (ref 0–0.5)
LDLC SERPL CALC-MCNC: 101 MG/DL (ref 0–100)
LIPID PROFILE,FLP: ABNORMAL
LYMPHOCYTES # BLD: 3.3 K/UL (ref 0.9–3.6)
LYMPHOCYTES NFR BLD: 39 % (ref 21–52)
MCH RBC QN AUTO: 28.5 PG (ref 24–34)
MCHC RBC AUTO-ENTMCNC: 32.9 G/DL (ref 31–37)
MCV RBC AUTO: 86.7 FL (ref 78–100)
MONOCYTES # BLD: 0.6 K/UL (ref 0.05–1.2)
MONOCYTES NFR BLD: 7 % (ref 3–10)
NEUTS SEG # BLD: 4.2 K/UL (ref 1.8–8)
NEUTS SEG NFR BLD: 49 % (ref 40–73)
NRBC # BLD: 0 K/UL (ref 0–0.01)
NRBC BLD-RTO: 0 PER 100 WBC
PLATELET # BLD AUTO: 336 K/UL (ref 135–420)
PMV BLD AUTO: 10.9 FL (ref 9.2–11.8)
POTASSIUM SERPL-SCNC: 4.6 MMOL/L (ref 3.5–5.5)
PROT SERPL-MCNC: 7.7 G/DL (ref 6.4–8.2)
RBC # BLD AUTO: 4.87 M/UL (ref 4.2–5.3)
SODIUM SERPL-SCNC: 140 MMOL/L (ref 136–145)
TRIGL SERPL-MCNC: 160 MG/DL (ref ?–150)
VLDLC SERPL CALC-MCNC: 32 MG/DL
WBC # BLD AUTO: 8.5 K/UL (ref 4.6–13.2)

## 2022-07-28 PROCEDURE — 80053 COMPREHEN METABOLIC PANEL: CPT

## 2022-07-28 PROCEDURE — 83036 HEMOGLOBIN GLYCOSYLATED A1C: CPT

## 2022-07-28 PROCEDURE — 85025 COMPLETE CBC W/AUTO DIFF WBC: CPT

## 2022-07-28 PROCEDURE — 82306 VITAMIN D 25 HYDROXY: CPT

## 2022-07-28 PROCEDURE — 80061 LIPID PANEL: CPT

## 2022-07-28 PROCEDURE — 36415 COLL VENOUS BLD VENIPUNCTURE: CPT

## 2022-08-08 ENCOUNTER — OFFICE VISIT (OUTPATIENT)
Dept: NEUROLOGY | Age: 67
End: 2022-08-08
Payer: MEDICARE

## 2022-08-08 VITALS
HEIGHT: 63 IN | BODY MASS INDEX: 29.88 KG/M2 | HEART RATE: 83 BPM | DIASTOLIC BLOOD PRESSURE: 68 MMHG | RESPIRATION RATE: 20 BRPM | WEIGHT: 168.6 LBS | SYSTOLIC BLOOD PRESSURE: 124 MMHG | OXYGEN SATURATION: 94 %

## 2022-08-08 DIAGNOSIS — R41.3 AMNESIA: Primary | ICD-10-CM

## 2022-08-08 DIAGNOSIS — G45.4 TGA (TRANSIENT GLOBAL AMNESIA): ICD-10-CM

## 2022-08-08 PROCEDURE — G8399 PT W/DXA RESULTS DOCUMENT: HCPCS | Performed by: NURSE PRACTITIONER

## 2022-08-08 PROCEDURE — G8432 DEP SCR NOT DOC, RNG: HCPCS | Performed by: NURSE PRACTITIONER

## 2022-08-08 PROCEDURE — 1101F PT FALLS ASSESS-DOCD LE1/YR: CPT | Performed by: NURSE PRACTITIONER

## 2022-08-08 PROCEDURE — 3017F COLORECTAL CA SCREEN DOC REV: CPT | Performed by: NURSE PRACTITIONER

## 2022-08-08 PROCEDURE — G0463 HOSPITAL OUTPT CLINIC VISIT: HCPCS | Performed by: NURSE PRACTITIONER

## 2022-08-08 PROCEDURE — G8417 CALC BMI ABV UP PARAM F/U: HCPCS | Performed by: NURSE PRACTITIONER

## 2022-08-08 PROCEDURE — G9899 SCRN MAM PERF RSLTS DOC: HCPCS | Performed by: NURSE PRACTITIONER

## 2022-08-08 PROCEDURE — G8536 NO DOC ELDER MAL SCRN: HCPCS | Performed by: NURSE PRACTITIONER

## 2022-08-08 PROCEDURE — 1123F ACP DISCUSS/DSCN MKR DOCD: CPT | Performed by: NURSE PRACTITIONER

## 2022-08-08 PROCEDURE — 99203 OFFICE O/P NEW LOW 30 MIN: CPT | Performed by: NURSE PRACTITIONER

## 2022-08-08 PROCEDURE — G8427 DOCREV CUR MEDS BY ELIG CLIN: HCPCS | Performed by: NURSE PRACTITIONER

## 2022-08-08 PROCEDURE — 1090F PRES/ABSN URINE INCON ASSESS: CPT | Performed by: NURSE PRACTITIONER
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
## 2022-08-09 ENCOUNTER — OFFICE VISIT (OUTPATIENT)
Dept: FAMILY MEDICINE CLINIC | Age: 67
End: 2022-08-09
Payer: MEDICARE

## 2022-08-09 ENCOUNTER — TELEPHONE (OUTPATIENT)
Dept: FAMILY MEDICINE CLINIC | Age: 67
End: 2022-08-09

## 2022-08-09 VITALS
SYSTOLIC BLOOD PRESSURE: 118 MMHG | WEIGHT: 169 LBS | BODY MASS INDEX: 29.95 KG/M2 | OXYGEN SATURATION: 98 % | HEIGHT: 63 IN | TEMPERATURE: 98.2 F | DIASTOLIC BLOOD PRESSURE: 66 MMHG | RESPIRATION RATE: 16 BRPM | HEART RATE: 73 BPM

## 2022-08-09 DIAGNOSIS — G45.4 TRANSIENT GLOBAL AMNESIA: ICD-10-CM

## 2022-08-09 DIAGNOSIS — E78.5 HYPERLIPIDEMIA WITH TARGET LDL LESS THAN 130: ICD-10-CM

## 2022-08-09 DIAGNOSIS — R41.3 AMNESIA: ICD-10-CM

## 2022-08-09 DIAGNOSIS — Z01.818 PRE-OP EXAM: ICD-10-CM

## 2022-08-09 DIAGNOSIS — K76.0 FATTY LIVER: ICD-10-CM

## 2022-08-09 DIAGNOSIS — G43.809 OTHER MIGRAINE WITHOUT STATUS MIGRAINOSUS, NOT INTRACTABLE: ICD-10-CM

## 2022-08-09 DIAGNOSIS — F41.9 ANXIETY: ICD-10-CM

## 2022-08-09 DIAGNOSIS — R73.01 IFG (IMPAIRED FASTING GLUCOSE): ICD-10-CM

## 2022-08-09 DIAGNOSIS — E55.9 VITAMIN D DEFICIENCY: ICD-10-CM

## 2022-08-09 DIAGNOSIS — H26.9 CATARACT OF BOTH EYES, UNSPECIFIED CATARACT TYPE: Primary | ICD-10-CM

## 2022-08-09 PROCEDURE — 99214 OFFICE O/P EST MOD 30 MIN: CPT | Performed by: FAMILY MEDICINE

## 2022-08-09 PROCEDURE — G8427 DOCREV CUR MEDS BY ELIG CLIN: HCPCS | Performed by: FAMILY MEDICINE

## 2022-08-09 PROCEDURE — G9899 SCRN MAM PERF RSLTS DOC: HCPCS | Performed by: FAMILY MEDICINE

## 2022-08-09 PROCEDURE — G8399 PT W/DXA RESULTS DOCUMENT: HCPCS | Performed by: FAMILY MEDICINE

## 2022-08-09 PROCEDURE — 3017F COLORECTAL CA SCREEN DOC REV: CPT | Performed by: FAMILY MEDICINE

## 2022-08-09 PROCEDURE — 1123F ACP DISCUSS/DSCN MKR DOCD: CPT | Performed by: FAMILY MEDICINE

## 2022-08-09 PROCEDURE — 1101F PT FALLS ASSESS-DOCD LE1/YR: CPT | Performed by: FAMILY MEDICINE

## 2022-08-09 PROCEDURE — G8536 NO DOC ELDER MAL SCRN: HCPCS | Performed by: FAMILY MEDICINE

## 2022-08-09 PROCEDURE — G8417 CALC BMI ABV UP PARAM F/U: HCPCS | Performed by: FAMILY MEDICINE

## 2022-08-09 PROCEDURE — G0463 HOSPITAL OUTPT CLINIC VISIT: HCPCS | Performed by: FAMILY MEDICINE

## 2022-08-09 PROCEDURE — 1090F PRES/ABSN URINE INCON ASSESS: CPT | Performed by: FAMILY MEDICINE

## 2022-08-09 PROCEDURE — G8432 DEP SCR NOT DOC, RNG: HCPCS | Performed by: FAMILY MEDICINE

## 2022-08-09 NOTE — PROGRESS NOTES
SUBJECTIVE  Chief Complaint   Patient presents with    Pre-op Exam     Cataract left eye    Anxiety    Cholesterol Problem    Migraine    Results     Review of results       Follow-up for ER visit (TGA) and pre-op clearance. Sent here for pre-op for cataract surgery for her left eye. She had her right eye done in May - paperwork filled out. Was in ER after an episode where she was acting strange - confused and had no recollection of things. It was transient. She has since seen neurology who ordered an EEG, sleep and wake. She has migraines and did not take any Zomig prior to the episode. She has since then though. Hypercholesterolemia - Taking Lipitor 40mg with no side effects however may forget doses. No myalgias or cramping reported. No chest pain or dyspnea. The patient's anxiety is controlled on Effexor. OBJECTIVE    Blood pressure 118/66, pulse 73, temperature 98.2 °F (36.8 °C), temperature source Temporal, resp. rate 16, height 5' 3\" (1.6 m), weight 169 lb (76.7 kg), SpO2 98 %. General:  Alert, cooperative, well appearing, in no apparent distress. Chest: clear, no w/r/r. Heart: normal s1s2, RRR, no murmurs. Ext: no swelling present. Psych: normal affect. Mood good. Oriented x 3. Judgement and insight intact. ASSESSMENT / PLAN    ICD-10-CM ICD-9-CM    1. Cataract of both eyes, unspecified cataract type  H26.9 366.9       2. Pre-op exam  Z01.818 V72.84       3. IFG (impaired fasting glucose)  R73.01 790.21       4. Hyperlipidemia with target LDL less than 130  E78.5 272.4       5. Vitamin D deficiency  E55.9 268.9       6. Anxiety  F41.9 300.00       7. Fatty liver  K76.0 571.8       8. Transient global amnesia  G45.4 437.7       9. Other migraine without status migrainosus, not intractable  G43.809 346.80         Cataracts - reviewed ophthalmology notes.   Proceed with surgery but I have asked on the forms that they check with neuro since her EEG will be done after her procedure. Pre-op forms filled out. IFG - reviewed A1c. Advised on exercise and carb reduction. I will recheck this in 6 months in the office. Hyperlipidemia - Uncontrolled due to intermittent compliance in the past / missing doses occasionally. Continue Lipitor 40mg nightly. No hepatotoxicity. Cont diet and exercise. Check lipids annually. Vit D def - controlled on vitamin D 50,000 weekly. Check levels with annual labs. Anxiety - Controlled. Continue Effexor. Refills as needed. Fatty liver -  Cont diet and exercise. Track LFTs annually at least.    Migraines / Transient global amnesia  - Zomig as needed for migraines. Cont follow-up and work-up with neuro until they release her. All chart history elements were reviewed by me at the time of the visit even though marked at time of note closure. Patient understands our medical plan. Patient has provided input and agrees with goals. Alternatives have been explained and offered. All questions answered. The patient is to call if condition worsens or fails to improve. Follow-up and Dispositions    Return in about 6 months (around 2/9/2023) for routine care and POC A1c in office .

## 2022-08-09 NOTE — TELEPHONE ENCOUNTER
TONI Figueroa from [facility] states that pt went to the [address] on 7/25/2022 and then saw NP Carlos Putnam from neuroscience center on 8/8/2022 for TGA . Pt is du to have cataract surgery and TONI Barfield is requesting clearance from Neurology. She would like to know if Dr Jess Syed will update her clearance at her appt today with him. Any questions please call her [phone].  Thank you

## 2022-08-09 NOTE — PROGRESS NOTES
Chief Complaint   Patient presents with    Pre-op Exam     Cataract left eye    Anxiety    Cholesterol Problem    Migraine    Results     Review of results     Vitamin D Deficiency        1. \"Have you been to the ER, urgent care clinic since your last visit? Hospitalized since your last visit? \" Yes 07-  dissociative fugue due to stress reaction    2. \"Have you seen or consulted any other health care providers outside of the [address] [place] [address] since your last visit? \" No     3. For patients aged 39-70: Has the patient had a colonoscopy / FIT/ Cologuard? Yes - no Care Gap present      If the patient is female:    4. For patients aged 41-77: Has the patient had a mammogram within the past 2 years? Yes - no Care Gap present      5. For patients aged 21-65: Has the patient had a pap smear?  Yes - no Care Gap present

## 2022-08-09 NOTE — PATIENT INSTRUCTIONS
A Healthy Lifestyle: Care Instructions  Your Care Instructions     A healthy lifestyle can help you feel good, stay at a healthy weight, and have plenty of energy for both work and play. A healthy lifestyle is something you can share with your whole family. A healthy lifestyle also can lower your risk for serious health problems, such as high blood pressure, heart disease, and diabetes. You can follow a few steps listed below to improve your health and the health of your family. Follow-up care is a key part of your treatment and safety. Be sure to make and go to all appointments, and call your doctor if you are having problems. It's also a good idea to know your test results and keep a list of the medicines you take. How can you care for yourself at home? Do not eat too much sugar, fat, or fast foods. You can still have dessert and treats now and then. The goal is moderation. Start small to improve your eating habits. Pay attention to portion sizes, drink less juice and soda pop, and eat more fruits and vegetables. Eat a healthy amount of food. A 3-ounce serving of meat, for example, is about the size of a deck of cards. Fill the rest of your plate with vegetables and whole grains. Limit the amount of soda and sports drinks you have every day. Drink more water when you are thirsty. Eat plenty of fruits and vegetables every day. Have an apple or some carrot sticks as an afternoon snack instead of a candy bar. Try to have fruits and/or vegetables at every meal.  Make exercise part of your daily routine. You may want to start with simple activities, such as walking, bicycling, or slow swimming. Try to be active 30 to 60 minutes every day. You do not need to do all 30 to 60 minutes all at once. For example, you can exercise 3 times a day for 10 or 20 minutes. Moderate exercise is safe for most people, but it is always a good idea to talk to your doctor before starting an exercise program.  Keep moving.  Beverly Marx the lawn, work in the garden, or clean your house. Take the stairs instead of the elevator at work. If you smoke, quit. People who smoke have an increased risk for heart attack, stroke, cancer, and other lung illnesses. Quitting is hard, but there are ways to boost your chance of quitting tobacco for good. Use nicotine gum, patches, or lozenges. Ask your doctor about stop-smoking programs and medicines. Keep trying. In addition to reducing your risk of diseases in the future, you will notice some benefits soon after you stop using tobacco. If you have shortness of breath or asthma symptoms, they will likely get better within a few weeks after you quit. Limit how much alcohol you drink. Moderate amounts of alcohol (up to 2 drinks a day for men, 1 drink a day for women) are okay. But drinking too much can lead to liver problems, high blood pressure, and other health problems. Family health  If you have a family, there are many things you can do together to improve your health. Eat meals together as a family as often as possible. Eat healthy foods. This includes fruits, vegetables, lean meats and dairy, and whole grains. Include your family in your fitness plan. Most people think of activities such as jogging or tennis as the way to fitness, but there are many ways you and your family can be more active. Anything that makes you breathe hard and gets your heart pumping is exercise. Here are some tips:  Walk to do errands or to take your child to school or the bus. Go for a family bike ride after dinner instead of watching TV. Where can you learn more? Go to http://www.gray.com/  Enter O315 in the search box to learn more about \"A Healthy Lifestyle: Care Instructions. \"  Current as of: June 16, 2021               Content Version: 13.2  © 2534-4708 [employer].    Care instructions adapted under license by [employer] (which disclaims liability or warranty for this information). If you have questions about a medical condition or this instruction, always ask your healthcare professional. [place] [address] any warranty or liability for your use of this information.

## 2022-08-10 ENCOUNTER — TELEPHONE (OUTPATIENT)
Dept: NEUROLOGY | Age: 67
End: 2022-08-10

## 2022-08-10 NOTE — TELEPHONE ENCOUNTER
Kristina Sidhu from [facility] called because they are waiting on the clearance form for the pt. Surgery. Fax: [phone]  Please advise.

## 2022-08-11 ENCOUNTER — TELEPHONE (OUTPATIENT)
Dept: NEUROLOGY | Age: 67
End: 2022-08-11

## 2022-08-11 NOTE — TELEPHONE ENCOUNTER
[facility] called in reference to the pt. Clearance form for her surgery on August 15th. They would like Carmelo Espinoza to send the form either today or tomorrow since the patient surgery is schedule on Monday  Please advise. SCDs

## 2022-08-12 NOTE — TELEPHONE ENCOUNTER
Attempted to contact Bj Lr PA-C, No answer, left VM in regard to patient not cleared for Eye Surgery, Spoke with NP Reymundo Hansen, recommends waiting on results of another test.

## 2022-08-17 ENCOUNTER — HOSPITAL ENCOUNTER (OUTPATIENT)
Dept: NEUROLOGY | Age: 67
Discharge: HOME | End: 2022-08-17
Attending: NURSE PRACTITIONER
Payer: MEDICARE

## 2022-08-17 PROCEDURE — 95819 EEG AWAKE AND ASLEEP: CPT

## 2022-08-23 NOTE — PROCEDURES
[address] [place] [address]   EEG    Name:  Chapis Marlow  MR#:   [id number]  :  1955  ACCOUNT #:  [de-identified]  DATE OF SERVICE:  2022      OUTPATIENT RECORDING    ORDERING PROVIDER:  EEG was ordered by Laura Zamarripa NP.    EEG NUMBER:  MV EEG     REASON FOR EEG:  For evaluation of an episode of alteration of awareness. MEDICATIONS:  The patient's medications at the time of recording include:  1. Lipitor. 2.  Effexor. EEG TECHNIQUE USED:  A standard 10-20 system with 16-channel recording and an EKG. Activation procedure used was photic stimulation. Total duration of the recording was 25 minutes. This is an awake and drowsy EEG recording. EEG REPORT:  The background consists of posterior-dominant alpha rhythms at a frequency of 10-11 Hz and they attenuate to eye opening. No significant asymmetry between the right and left sides. There are no obvious spikes or sharp wave discharges. No focal slowing. No abnormal discharges during photic stimulation and drowsiness. IMPRESSION:  This is a normal EEG recording. CLINICAL CORRELATION:  Normal EEG does not rule out the possibility of seizures or epilepsy.         MD KAI [email or web address]  D:  2022 9:42  T:  2022 14:14  JOB #:  [id number]

## 2022-09-30 NOTE — PROGRESS NOTES
1. \"Have you been to the ER, urgent care clinic since your last visit? Hospitalized since your last visit? \" No    2. \"Have you seen or consulted any other health care providers outside of the [address] [place] [address] since your last visit? \" Yes Where: ophthalmology      3. For patients aged 39-70: Has the patient had a colonoscopy / FIT/ Cologuard? Yes - no Care Gap present-due 8/2025      If the patient is female:    4. For patients aged 41-77: Has the patient had a mammogram within the past 2 years? Yes - no Care Gap present      5. For patients aged 21-65: Has the patient had a pap smear? Yes - no Care Gap present    Physician order obtained. Patient completed adult immunization consent form. Allergies, contraindications and recommendations reviewed with patient. High dose seasonal influenza vaccine administered IM left deltoid. Patient tolerated well. Patient remained in office for 15 minutes after injection and no adverse reactions were noted.

## 2022-10-03 ENCOUNTER — TELEPHONE (OUTPATIENT)
Dept: NEUROLOGY | Age: 67
End: 2022-10-03

## 2022-10-03 ENCOUNTER — OFFICE VISIT (OUTPATIENT)
Dept: FAMILY MEDICINE CLINIC | Age: 67
End: 2022-10-03
Payer: MEDICARE

## 2022-10-03 VITALS
WEIGHT: 171 LBS | RESPIRATION RATE: 14 BRPM | DIASTOLIC BLOOD PRESSURE: 64 MMHG | OXYGEN SATURATION: 97 % | TEMPERATURE: 97.7 F | BODY MASS INDEX: 30.3 KG/M2 | HEART RATE: 87 BPM | HEIGHT: 63 IN | SYSTOLIC BLOOD PRESSURE: 112 MMHG

## 2022-10-03 DIAGNOSIS — H26.9 CATARACT OF BOTH EYES, UNSPECIFIED CATARACT TYPE: Primary | ICD-10-CM

## 2022-10-03 DIAGNOSIS — G45.4 TRANSIENT GLOBAL AMNESIA: ICD-10-CM

## 2022-10-03 DIAGNOSIS — R73.01 IFG (IMPAIRED FASTING GLUCOSE): ICD-10-CM

## 2022-10-03 DIAGNOSIS — Z23 ENCOUNTER FOR IMMUNIZATION: ICD-10-CM

## 2022-10-03 PROCEDURE — G8417 CALC BMI ABV UP PARAM F/U: HCPCS | Performed by: FAMILY MEDICINE

## 2022-10-03 PROCEDURE — 99212 OFFICE O/P EST SF 10 MIN: CPT | Performed by: FAMILY MEDICINE

## 2022-10-03 PROCEDURE — 1090F PRES/ABSN URINE INCON ASSESS: CPT | Performed by: FAMILY MEDICINE

## 2022-10-03 PROCEDURE — 1123F ACP DISCUSS/DSCN MKR DOCD: CPT | Performed by: FAMILY MEDICINE

## 2022-10-03 PROCEDURE — G8432 DEP SCR NOT DOC, RNG: HCPCS | Performed by: FAMILY MEDICINE

## 2022-10-03 PROCEDURE — 1101F PT FALLS ASSESS-DOCD LE1/YR: CPT | Performed by: FAMILY MEDICINE

## 2022-10-03 PROCEDURE — 3017F COLORECTAL CA SCREEN DOC REV: CPT | Performed by: FAMILY MEDICINE

## 2022-10-03 PROCEDURE — G8536 NO DOC ELDER MAL SCRN: HCPCS | Performed by: FAMILY MEDICINE

## 2022-10-03 PROCEDURE — G8399 PT W/DXA RESULTS DOCUMENT: HCPCS | Performed by: FAMILY MEDICINE

## 2022-10-03 PROCEDURE — G8427 DOCREV CUR MEDS BY ELIG CLIN: HCPCS | Performed by: FAMILY MEDICINE

## 2022-10-03 PROCEDURE — 90694 VACC AIIV4 NO PRSRV 0.5ML IM: CPT | Performed by: FAMILY MEDICINE

## 2022-10-03 PROCEDURE — G0463 HOSPITAL OUTPT CLINIC VISIT: HCPCS | Performed by: FAMILY MEDICINE

## 2022-10-03 PROCEDURE — G9899 SCRN MAM PERF RSLTS DOC: HCPCS | Performed by: FAMILY MEDICINE

## 2022-10-03 NOTE — TELEPHONE ENCOUNTER
EEG was normal.  She can proceed with her cataract surgery from the neurologic standpoint. Follow up here as needed.

## 2022-10-03 NOTE — PROGRESS NOTES
SUBJECTIVE  Chief Complaint   Patient presents with    Pre-op Exam     Pre op exam for left cataract removal to be performed on 10/17/2022 at [address] by Dr. Rosezena Hashimoto under [facility] sedation      Follow-up pre-op clearance and after neuro consultation for possible TGA. Saw neurology. No recurrent issues. Had an EEG which is in the chart. Neurology advised on as needed follow-up after normal results. he had her right eye done in May. No complaints. ROS is negative. OBJECTIVE    Blood pressure 112/64, pulse 87, temperature 97.7 °F (36.5 °C), temperature source Temporal, resp. rate 14, height 5' 3\" (1.6 m), weight 171 lb (77.6 kg), SpO2 97 %. General:  Alert, cooperative, well appearing, in no apparent distress. Chest: clear, no w/r/r. Heart: normal s1s2, RRR, no murmurs. Ext: no swelling present. Neuro: no deficits. Psych: normal affect. Mood good. Oriented x 3. Judgement and insight intact. ASSESSMENT / PLAN    ICD-10-CM ICD-9-CM    1. Cataract of both eyes, unspecified cataract type  H26.9 366.9       2. Encounter for immunization  Z23 V03.89 ADMIN INFLUENZA VIRUS VAC      INFLUENZA, FLUAD, (AGE 72 Y+), IM, PF, 0.5 ML      3. IFG (impaired fasting glucose)  R73.01 790.21       4. Transient global amnesia  G45.4 437.7         Cataracts - reviewed ophthalmology notes. Proceed with surgery. Pre-op forms filled out. IFG - A1c showing control. Migraines / Transient global amnesia  - Zomig as needed for migraines. Cont follow-up and work-up with neuro as needed per their note to me via staff message. \"Dr. Karen Nogueira,     I received the EEG results so we will let her know that she can proceed with the procedure from the neuro standpoint and can follow up here as needed. We will fill out the form if needed. Thank you so much for reaching out. Heather\"    Flu vaccine administered.     All chart history elements were reviewed by me at the time of the visit even though marked at time of note closure. Patient understands our medical plan. Patient has provided input and agrees with goals. Alternatives have been explained and offered. All questions answered. The patient is to call if condition worsens or fails to improve. Follow-up and Dispositions    Return in about 4 months (around 2/3/2023) for routine care. A1c to be done in office.

## 2022-10-04 NOTE — TELEPHONE ENCOUNTER
Contacted patient. Made her aware of provider's comments regarding EEG. Patient will contact primary and provide any necessary forms for completion.

## 2022-10-20 DIAGNOSIS — E78.5 HYPERLIPIDEMIA WITH TARGET LDL LESS THAN 130: ICD-10-CM

## 2022-10-20 DIAGNOSIS — F41.9 ANXIETY: ICD-10-CM

## 2022-10-20 RX ORDER — VENLAFAXINE HYDROCHLORIDE 75 MG/1
CAPSULE, EXTENDED RELEASE ORAL
Qty: 90 CAPSULE | Refills: 1 | Status: SHIPPED | OUTPATIENT
Start: 2022-10-20

## 2022-10-20 RX ORDER — ATORVASTATIN CALCIUM 40 MG/1
TABLET, FILM COATED ORAL
Qty: 90 TABLET | Refills: 1 | Status: SHIPPED | OUTPATIENT
Start: 2022-10-20

## 2022-10-31 ENCOUNTER — TELEPHONE (OUTPATIENT)
Dept: NEUROLOGY | Age: 67
End: 2022-10-31

## 2022-10-31 NOTE — TELEPHONE ENCOUNTER
Patient was cleared for  cararact surgery by RIA Bender in note  on 10/3/2022 [facility] would like a letter with that information in it faxed to [phone] before the patients surgery on 11/7/22. Please advise.

## 2023-02-06 NOTE — PROGRESS NOTES
1. \"Have you been to the ER, urgent care clinic since your last visit? Hospitalized since your last visit? \" No    2. \"Have you seen or consulted any other health care providers outside of the [address] [place] [address] since your last visit? \" Yes Where: Dr. April Archer, [address]      3. For patients aged 39-70: Has the patient had a colonoscopy / FIT/ Cologuard? Yes - no Care Gap present-due 8/2025      If the patient is female:    4. For patients aged 41-77: Has the patient had a mammogram within the past 2 years? Yes - no Care Gap present      5. For patients aged 21-65: Has the patient had a pap smear?  NA - based on age or sex

## 2023-02-07 ENCOUNTER — OFFICE VISIT (OUTPATIENT)
Dept: FAMILY MEDICINE CLINIC | Age: 68
End: 2023-02-07
Payer: MEDICARE

## 2023-02-07 VITALS
BODY MASS INDEX: 30.48 KG/M2 | DIASTOLIC BLOOD PRESSURE: 70 MMHG | OXYGEN SATURATION: 96 % | HEIGHT: 63 IN | TEMPERATURE: 98.1 F | WEIGHT: 172 LBS | SYSTOLIC BLOOD PRESSURE: 126 MMHG | HEART RATE: 81 BPM | RESPIRATION RATE: 14 BRPM

## 2023-02-07 DIAGNOSIS — K76.0 FATTY LIVER: ICD-10-CM

## 2023-02-07 DIAGNOSIS — G45.4 TRANSIENT GLOBAL AMNESIA: ICD-10-CM

## 2023-02-07 DIAGNOSIS — R73.01 IFG (IMPAIRED FASTING GLUCOSE): Primary | ICD-10-CM

## 2023-02-07 DIAGNOSIS — G43.809 OTHER MIGRAINE WITHOUT STATUS MIGRAINOSUS, NOT INTRACTABLE: ICD-10-CM

## 2023-02-07 DIAGNOSIS — E55.9 VITAMIN D DEFICIENCY: ICD-10-CM

## 2023-02-07 DIAGNOSIS — F41.9 ANXIETY: ICD-10-CM

## 2023-02-07 DIAGNOSIS — E78.5 HYPERLIPIDEMIA WITH TARGET LDL LESS THAN 130: ICD-10-CM

## 2023-02-07 PROBLEM — F43.9: Status: ACTIVE | Noted: 2023-02-07

## 2023-02-07 PROBLEM — F44.1: Status: RESOLVED | Noted: 2023-02-07 | Resolved: 2023-02-07

## 2023-02-07 PROBLEM — F43.9: Status: RESOLVED | Noted: 2023-02-07 | Resolved: 2023-02-07

## 2023-02-07 PROBLEM — F44.1: Status: ACTIVE | Noted: 2023-02-07

## 2023-02-07 LAB — HBA1C MFR BLD HPLC: 6.2 %

## 2023-02-07 PROCEDURE — 83036 HEMOGLOBIN GLYCOSYLATED A1C: CPT | Performed by: FAMILY MEDICINE

## 2023-02-07 PROCEDURE — G0463 HOSPITAL OUTPT CLINIC VISIT: HCPCS | Performed by: FAMILY MEDICINE

## 2023-02-07 NOTE — PROGRESS NOTES
SUBJECTIVE  Chief Complaint   Patient presents with    Other     IFG      Follow-up for lab review for hypercholesterolemia, anxiety, fatty liver, and hypovitaminosis D. Hypercholesterolemia - Taking Lipitor 40mg with no side effects. Says she is not missing doses anymore. No myalgias or cramping reported. No chest pain or dyspnea. The patient's anxiety is controlled on Effexor. No side effects noted. Takes Zomig as needed for rare migraines. Has had complete neuro work-up without any further episodes. OBJECTIVE    Blood pressure 126/70, pulse 81, temperature 98.1 °F (36.7 °C), temperature source Temporal, resp. rate 14, height 5' 3\" (1.6 m), weight 172 lb (78 kg), SpO2 96 %. General:  Alert, cooperative, well appearing, in no apparent distress. Chest: clear, no w/r/r. Heart: normal s1s2, RRR, no murmurs. Ext: no swelling present. Psych: normal affect. Mood good. Oriented x 3. Judgement and insight intact. Results for orders placed or performed in visit on 02/07/23   AMB POC HEMOGLOBIN A1C   Result Value Ref Range    Hemoglobin A1c (POC) 6.2 %     ASSESSMENT / PLAN    ICD-10-CM ICD-9-CM    1. IFG (impaired fasting glucose)  R73.01 790.21 AMB POC HEMOGLOBIN A1C      HEMOGLOBIN A1C W/O EAG      2. Hyperlipidemia with target LDL less than 130  E78.5 272.4 CBC WITH AUTOMATED DIFF      METABOLIC PANEL, COMPREHENSIVE      LIPID PANEL      3. Anxiety  F41.9 300.00       4. Transient global amnesia  G45.4 437.7       5. Vitamin D deficiency  E55.9 268.9 VITAMIN D, 25 HYDROXY      6. Fatty liver  T42.6 134.5 METABOLIC PANEL, COMPREHENSIVE      7. Other migraine without status migrainosus, not intractable  G43.809 346.80         IFG - reviewed A1c. Advised on exercise and carb reduction. Advised on checking A1c in 6 months. Hyperlipidemia - Continue Lipitor 40mg nightly. No hepatotoxicity. Cont diet and exercise. Check fasting labs in 6 months. Anxiety - Controlled.   Continue Effexor. Refills as needed. Transient global amnesia - resolved. Completed work-up. Vit D def - controlled on vitamin D 50,000 weekly. Check levels next set of labs. Fatty liver -  Encourage diet and exercise. Track LFTs annually at least.    Migraines - Zomig as needed for migraines. Advised on importance of CPE / well woman exam with OB/GYN. All chart history elements were reviewed by me at the time of the visit even though marked at time of note closure. Patient understands our medical plan. Patient has provided input and agrees with goals. Alternatives have been explained and offered. All questions answered. The patient is to call if condition worsens or fails to improve. Follow-up and Dispositions    Return in about 6 months (around 8/7/2023) for routine care and Medicare Wellness exam. Fasting labs due 3-7 days prior to appointment.

## 2023-02-07 NOTE — PATIENT INSTRUCTIONS

## 2023-04-21 RX ORDER — VENLAFAXINE HYDROCHLORIDE 75 MG/1
CAPSULE, EXTENDED RELEASE ORAL
Qty: 90 CAPSULE | Refills: 1 | Status: SHIPPED | OUTPATIENT
Start: 2023-04-21

## 2023-04-21 RX ORDER — ATORVASTATIN CALCIUM 40 MG/1
TABLET, FILM COATED ORAL
Qty: 90 TABLET | Refills: 3 | Status: SHIPPED | OUTPATIENT
Start: 2023-04-21

## 2023-07-18 NOTE — TELEPHONE ENCOUNTER
Requested Prescriptions     Pending Prescriptions Disp Refills    vitamin D (ERGOCALCIFEROL) 1.25 MG (96296 UT) CAPS capsule [Pharmacy Med Name: VIT D-2 CAP(ERGOCAL)1.25MG 50,000U] 12 capsule 3     Sig: TAKE 1 CAPSULE EVERY 7 DAYS     Last OV: 02/07/2023  Last labs: POC 02/07/2023  Next OV and labs:08/21/2023

## 2023-07-19 RX ORDER — ERGOCALCIFEROL 1.25 MG/1
CAPSULE ORAL
Qty: 12 CAPSULE | Refills: 3 | Status: SHIPPED | OUTPATIENT
Start: 2023-07-19

## 2023-08-16 ENCOUNTER — HOSPITAL ENCOUNTER (OUTPATIENT)
Facility: HOSPITAL | Age: 68
Discharge: HOME | End: 2023-08-19
Payer: MEDICARE

## 2023-08-16 LAB
25(OH)D3 SERPL-MCNC: 63.6 NG/ML (ref 30–100)
ALBUMIN SERPL-MCNC: 3.7 G/DL (ref 3.4–5)
ALBUMIN/GLOB SERPL: 1 (ref 0.8–1.7)
ALP SERPL-CCNC: 84 U/L (ref 45–117)
ALT SERPL-CCNC: 55 U/L (ref 13–56)
ANION GAP SERPL CALC-SCNC: 4 MMOL/L (ref 3–18)
AST SERPL-CCNC: 30 U/L (ref 10–38)
BASOPHILS # BLD: 0 K/UL (ref 0–0.1)
BASOPHILS NFR BLD: 1 % (ref 0–2)
BILIRUB SERPL-MCNC: 0.8 MG/DL (ref 0.2–1)
BUN SERPL-MCNC: 17 MG/DL (ref 7–18)
BUN/CREAT SERPL: 20 (ref 12–20)
CALCIUM SERPL-MCNC: 9.1 MG/DL (ref 8.5–10.1)
CHLORIDE SERPL-SCNC: 105 MMOL/L (ref 100–111)
CHOLEST SERPL-MCNC: 158 MG/DL
CO2 SERPL-SCNC: 29 MMOL/L (ref 21–32)
CREAT SERPL-MCNC: 0.87 MG/DL (ref 0.6–1.3)
DIFFERENTIAL METHOD BLD: ABNORMAL
EOSINOPHIL # BLD: 0.3 K/UL (ref 0–0.4)
EOSINOPHIL NFR BLD: 4 % (ref 0–5)
ERYTHROCYTE [DISTWIDTH] IN BLOOD BY AUTOMATED COUNT: 13.2 % (ref 11.6–14.5)
GLOBULIN SER CALC-MCNC: 3.7 G/DL (ref 2–4)
GLUCOSE SERPL-MCNC: 106 MG/DL (ref 74–99)
HBA1C MFR BLD: 6.2 % (ref 4.2–5.6)
HCT VFR BLD AUTO: 44.4 % (ref 35–45)
HDLC SERPL-MCNC: 43 MG/DL (ref 40–60)
HDLC SERPL: 3.7 (ref 0–5)
HGB BLD-MCNC: 14.2 G/DL (ref 12–16)
IMM GRANULOCYTES # BLD AUTO: 0 K/UL (ref 0–0.04)
IMM GRANULOCYTES NFR BLD AUTO: 0 % (ref 0–0.5)
LDLC SERPL CALC-MCNC: 80.8 MG/DL (ref 0–100)
LIPID PANEL: ABNORMAL
LYMPHOCYTES # BLD: 2.2 K/UL (ref 0.9–3.6)
LYMPHOCYTES NFR BLD: 31 % (ref 21–52)
MCH RBC QN AUTO: 28.4 PG (ref 24–34)
MCHC RBC AUTO-ENTMCNC: 32 G/DL (ref 31–37)
MCV RBC AUTO: 88.8 FL (ref 78–100)
MONOCYTES # BLD: 0.9 K/UL (ref 0.05–1.2)
MONOCYTES NFR BLD: 12 % (ref 3–10)
NEUTS SEG # BLD: 3.7 K/UL (ref 1.8–8)
NEUTS SEG NFR BLD: 52 % (ref 40–73)
NRBC # BLD: 0 K/UL (ref 0–0.01)
NRBC BLD-RTO: 0 PER 100 WBC
PLATELET # BLD AUTO: 286 K/UL (ref 135–420)
PMV BLD AUTO: 10.9 FL (ref 9.2–11.8)
POTASSIUM SERPL-SCNC: 4.4 MMOL/L (ref 3.5–5.5)
PROT SERPL-MCNC: 7.4 G/DL (ref 6.4–8.2)
RBC # BLD AUTO: 5 M/UL (ref 4.2–5.3)
SODIUM SERPL-SCNC: 138 MMOL/L (ref 136–145)
TRIGL SERPL-MCNC: 171 MG/DL
VLDLC SERPL CALC-MCNC: 34.2 MG/DL
WBC # BLD AUTO: 7.1 K/UL (ref 4.6–13.2)

## 2023-08-16 PROCEDURE — 36415 COLL VENOUS BLD VENIPUNCTURE: CPT

## 2023-08-16 PROCEDURE — 83036 HEMOGLOBIN GLYCOSYLATED A1C: CPT

## 2023-08-16 PROCEDURE — 80053 COMPREHEN METABOLIC PANEL: CPT

## 2023-08-16 PROCEDURE — 85025 COMPLETE CBC W/AUTO DIFF WBC: CPT

## 2023-08-16 PROCEDURE — 82306 VITAMIN D 25 HYDROXY: CPT

## 2023-08-16 PROCEDURE — 80061 LIPID PANEL: CPT

## 2023-08-16 SDOH — HEALTH STABILITY: PHYSICAL HEALTH: ON AVERAGE, HOW MANY MINUTES DO YOU ENGAGE IN EXERCISE AT THIS LEVEL?: 20 MIN

## 2023-08-16 SDOH — HEALTH STABILITY: PHYSICAL HEALTH: ON AVERAGE, HOW MANY DAYS PER WEEK DO YOU ENGAGE IN MODERATE TO STRENUOUS EXERCISE (LIKE A BRISK WALK)?: 3 DAYS

## 2023-08-16 ASSESSMENT — PATIENT HEALTH QUESTIONNAIRE - PHQ9
SUM OF ALL RESPONSES TO PHQ QUESTIONS 1-9: 0
SUM OF ALL RESPONSES TO PHQ QUESTIONS 1-9: 0
SUM OF ALL RESPONSES TO PHQ9 QUESTIONS 1 & 2: 0
2. FEELING DOWN, DEPRESSED OR HOPELESS: 0
1. LITTLE INTEREST OR PLEASURE IN DOING THINGS: 0
SUM OF ALL RESPONSES TO PHQ QUESTIONS 1-9: 0
SUM OF ALL RESPONSES TO PHQ QUESTIONS 1-9: 0

## 2023-08-16 ASSESSMENT — LIFESTYLE VARIABLES
HOW MANY STANDARD DRINKS CONTAINING ALCOHOL DO YOU HAVE ON A TYPICAL DAY: 1
HOW OFTEN DO YOU HAVE A DRINK CONTAINING ALCOHOL: 4
HOW OFTEN DO YOU HAVE SIX OR MORE DRINKS ON ONE OCCASION: 1
HOW OFTEN DO YOU HAVE A DRINK CONTAINING ALCOHOL: 2-3 TIMES A WEEK
HOW MANY STANDARD DRINKS CONTAINING ALCOHOL DO YOU HAVE ON A TYPICAL DAY: 1 OR 2

## 2023-08-18 SDOH — ECONOMIC STABILITY: HOUSING INSECURITY
IN THE LAST 12 MONTHS, WAS THERE A TIME WHEN YOU DID NOT HAVE A STEADY PLACE TO SLEEP OR SLEPT IN A SHELTER (INCLUDING NOW)?: NO

## 2023-08-18 SDOH — ECONOMIC STABILITY: FOOD INSECURITY: WITHIN THE PAST 12 MONTHS, YOU WORRIED THAT YOUR FOOD WOULD RUN OUT BEFORE YOU GOT MONEY TO BUY MORE.: NEVER TRUE

## 2023-08-18 SDOH — ECONOMIC STABILITY: TRANSPORTATION INSECURITY
IN THE PAST 12 MONTHS, HAS LACK OF TRANSPORTATION KEPT YOU FROM MEETINGS, WORK, OR FROM GETTING THINGS NEEDED FOR DAILY LIVING?: NO

## 2023-08-18 SDOH — ECONOMIC STABILITY: FOOD INSECURITY: WITHIN THE PAST 12 MONTHS, THE FOOD YOU BOUGHT JUST DIDN'T LAST AND YOU DIDN'T HAVE MONEY TO GET MORE.: NEVER TRUE

## 2023-08-18 SDOH — ECONOMIC STABILITY: INCOME INSECURITY: HOW HARD IS IT FOR YOU TO PAY FOR THE VERY BASICS LIKE FOOD, HOUSING, MEDICAL CARE, AND HEATING?: NOT VERY HARD

## 2023-08-18 NOTE — PROGRESS NOTES
1. \"Have you been to the ER, urgent care clinic since your last visit? Hospitalized since your last visit? \" No    2. \"Have you seen or consulted any other health care providers outside of the [address] [place] [address] since your last visit? \" No     3. For patients aged 43-73: Has the patient had a colonoscopy / FIT/ Cologuard? Yes - no Care Gap present-repeat colonoscopy due 8/2025 with GLS      If the patient is female:    4. For patients aged 43-66: Has the patient had a mammogram within the past 2 years? No-repeat annual mammogram was due 6/2023      5. For patients aged 21-65: Has the patient had a pap smear?  NA - based on age or sex

## 2023-08-21 ENCOUNTER — OFFICE VISIT (OUTPATIENT)
Age: 68
End: 2023-08-21
Payer: MEDICARE

## 2023-08-21 ENCOUNTER — OFFICE VISIT (OUTPATIENT)
Age: 68
End: 2023-08-21

## 2023-08-21 VITALS
RESPIRATION RATE: 14 BRPM | SYSTOLIC BLOOD PRESSURE: 122 MMHG | DIASTOLIC BLOOD PRESSURE: 72 MMHG | HEIGHT: 63 IN | TEMPERATURE: 97.5 F | HEART RATE: 75 BPM | OXYGEN SATURATION: 96 % | BODY MASS INDEX: 29.77 KG/M2 | WEIGHT: 168 LBS

## 2023-08-21 VITALS
TEMPERATURE: 97.5 F | HEIGHT: 63 IN | WEIGHT: 168 LBS | OXYGEN SATURATION: 96 % | RESPIRATION RATE: 14 BRPM | HEART RATE: 75 BPM | SYSTOLIC BLOOD PRESSURE: 122 MMHG | BODY MASS INDEX: 29.77 KG/M2 | DIASTOLIC BLOOD PRESSURE: 72 MMHG

## 2023-08-21 DIAGNOSIS — G43.809 OTHER MIGRAINE, NOT INTRACTABLE, WITHOUT STATUS MIGRAINOSUS: ICD-10-CM

## 2023-08-21 DIAGNOSIS — Z71.89 ACP (ADVANCE CARE PLANNING): ICD-10-CM

## 2023-08-21 DIAGNOSIS — R73.01 IMPAIRED FASTING GLUCOSE: Primary | ICD-10-CM

## 2023-08-21 DIAGNOSIS — Z00.00 MEDICARE ANNUAL WELLNESS VISIT, SUBSEQUENT: Primary | ICD-10-CM

## 2023-08-21 DIAGNOSIS — E55.9 VITAMIN D DEFICIENCY, UNSPECIFIED: ICD-10-CM

## 2023-08-21 DIAGNOSIS — E78.5 HYPERLIPIDEMIA, UNSPECIFIED HYPERLIPIDEMIA TYPE: ICD-10-CM

## 2023-08-21 DIAGNOSIS — F41.1 GENERALIZED ANXIETY DISORDER: ICD-10-CM

## 2023-08-21 DIAGNOSIS — K76.0 FATTY (CHANGE OF) LIVER, NOT ELSEWHERE CLASSIFIED: ICD-10-CM

## 2023-08-21 PROCEDURE — G8419 CALC BMI OUT NRM PARAM NOF/U: HCPCS | Performed by: FAMILY MEDICINE

## 2023-08-21 PROCEDURE — 99214 OFFICE O/P EST MOD 30 MIN: CPT | Performed by: FAMILY MEDICINE

## 2023-08-21 PROCEDURE — 3017F COLORECTAL CA SCREEN DOC REV: CPT | Performed by: FAMILY MEDICINE

## 2023-08-21 PROCEDURE — G8427 DOCREV CUR MEDS BY ELIG CLIN: HCPCS | Performed by: FAMILY MEDICINE

## 2023-08-21 PROCEDURE — 1036F TOBACCO NON-USER: CPT | Performed by: FAMILY MEDICINE

## 2023-08-21 PROCEDURE — 1090F PRES/ABSN URINE INCON ASSESS: CPT | Performed by: FAMILY MEDICINE

## 2023-08-21 PROCEDURE — G0439 PPPS, SUBSEQ VISIT: HCPCS | Performed by: FAMILY MEDICINE

## 2023-08-21 PROCEDURE — 1123F ACP DISCUSS/DSCN MKR DOCD: CPT | Performed by: FAMILY MEDICINE

## 2023-08-21 PROCEDURE — G8399 PT W/DXA RESULTS DOCUMENT: HCPCS | Performed by: FAMILY MEDICINE

## 2023-08-21 PROCEDURE — 99497 ADVNCD CARE PLAN 30 MIN: CPT | Performed by: FAMILY MEDICINE

## 2023-08-21 NOTE — PROGRESS NOTES
1. \"Have you been to the ER, urgent care clinic since your last visit? Hospitalized since your last visit? \" No     2. \"Have you seen or consulted any other health care providers outside of the [address] [place] [address] since your last visit? \" No      3. For patients aged 43-73: Has the patient had a colonoscopy / FIT/ Cologuard? Yes - no Care Gap present-repeat colonoscopy due 8/2025 with GLS        If the patient is female:     4. For patients aged 43-66: Has the patient had a mammogram within the past 2 years? No-repeat annual mammogram was due 6/2023        5. For patients aged 21-65: Has the patient had a pap smear?  NA - based on age or sex
explained and offered. All questions answered. The patient is to call if condition worsens or fails to improve. Return in about 6 months (around 2/21/2024) for routine care. A1c to be done in office.

## 2023-08-21 NOTE — ACP (ADVANCE CARE PLANNING)
Advance Care Planning   The patient has the following advanced directives on file:  Advance Directives       Power of [address] Will ACP-Advance Directive ACP-Power of     Not on File Not on File Not on File Not on File          Has at home. Was made about 10 years ago. The patient has appointed the following active healthcare agents:    Primary Decision Maker: Naty Menchaca - [phone]    The Patient has the following current code status:  She is unsure what is in her living will which is at home. She sates she is an organ donor. Visit Documentation:  I discussed Advance Care Planning with Swapnil Smith today which included the importance of making their choices for care and treatment in the case of a health event that adversely affects their decision-making abilities. She has not completed the Advance Care Directives. She has an active health care agent at this time. Swapnil Smith was encouraged to complete the declaration forms and provide a signed copy of her medical records. I advised patient we would continue this discussion at future visits. Goals of medical treatment were reviewed . The discussion lasted 16 minutes.     Susie Russo MD  8/21/2023

## 2023-08-21 NOTE — PATIENT INSTRUCTIONS
[facility] - [address] [facility]  [phone]      [facility]  [address] [address] [address] [phone]     [facility], [address]  Phone: [phone]

## 2023-10-10 RX ORDER — VENLAFAXINE HYDROCHLORIDE 75 MG/1
CAPSULE, EXTENDED RELEASE ORAL
Qty: 90 CAPSULE | Refills: 2 | Status: SHIPPED | OUTPATIENT
Start: 2023-10-10

## 2023-10-10 RX ORDER — PREDNISOLONE ACETATE 10 MG/ML
SUSPENSION/ DROPS OPHTHALMIC
COMMUNITY
Start: 2022-06-07

## 2024-02-14 DIAGNOSIS — M54.16 LUMBAR RADICULITIS: Primary | ICD-10-CM

## 2024-02-14 RX ORDER — GABAPENTIN 300 MG/1
300 CAPSULE ORAL
Qty: 30 CAPSULE | Refills: 0 | Status: SHIPPED | OUTPATIENT
Start: 2024-02-14 | End: 2024-03-15

## 2024-02-16 NOTE — PROGRESS NOTES
1. \"Have you been to the ER, urgent care clinic since your last visit?  Hospitalized since your last visit?\" Yes Urgent Care in [place] for sciatica attack     2. \"Have you seen or consulted any other health care providers outside of the [facility] since your last visit?\" No     3. For patients aged 45-75: Has the patient had a colonoscopy / FIT/ Cologuard? Yes - no Care Gap present due 08/07/2025      If the patient is female:    4. For patients aged 40-74: Has the patient had a mammogram within the past 2 years? Yes - Care Gap present. Most recent result on file last noted 06/09/2022      5. For patients aged 21-65: Has the patient had a pap smear? NA - based on age or sex

## 2024-02-19 ENCOUNTER — HOSPITAL ENCOUNTER (OUTPATIENT)
Facility: HOSPITAL | Age: 69
Discharge: HOME | End: 2024-02-22
Payer: MEDICARE

## 2024-02-19 ENCOUNTER — OFFICE VISIT (OUTPATIENT)
Age: 69
End: 2024-02-19
Payer: MEDICARE

## 2024-02-19 VITALS
OXYGEN SATURATION: 98 % | HEIGHT: 63 IN | WEIGHT: 164 LBS | TEMPERATURE: 98.3 F | DIASTOLIC BLOOD PRESSURE: 68 MMHG | SYSTOLIC BLOOD PRESSURE: 120 MMHG | HEART RATE: 76 BPM | RESPIRATION RATE: 16 BRPM | BODY MASS INDEX: 29.06 KG/M2

## 2024-02-19 DIAGNOSIS — Z12.31 ENCOUNTER FOR SCREENING MAMMOGRAM FOR MALIGNANT NEOPLASM OF BREAST: ICD-10-CM

## 2024-02-19 DIAGNOSIS — M54.16 LUMBAR RADICULITIS: ICD-10-CM

## 2024-02-19 DIAGNOSIS — R73.01 IMPAIRED FASTING GLUCOSE: Primary | ICD-10-CM

## 2024-02-19 DIAGNOSIS — G43.809 OTHER MIGRAINE, NOT INTRACTABLE, WITHOUT STATUS MIGRAINOSUS: ICD-10-CM

## 2024-02-19 DIAGNOSIS — E78.5 HYPERLIPIDEMIA, UNSPECIFIED HYPERLIPIDEMIA TYPE: ICD-10-CM

## 2024-02-19 DIAGNOSIS — K76.0 FATTY (CHANGE OF) LIVER, NOT ELSEWHERE CLASSIFIED: ICD-10-CM

## 2024-02-19 DIAGNOSIS — E55.9 VITAMIN D DEFICIENCY, UNSPECIFIED: ICD-10-CM

## 2024-02-19 DIAGNOSIS — F41.1 GENERALIZED ANXIETY DISORDER: ICD-10-CM

## 2024-02-19 LAB — HBA1C MFR BLD: 6.2 %

## 2024-02-19 PROCEDURE — PBSHW AMB POC HEMOGLOBIN A1C: Performed by: FAMILY MEDICINE

## 2024-02-19 PROCEDURE — 1036F TOBACCO NON-USER: CPT | Performed by: FAMILY MEDICINE

## 2024-02-19 PROCEDURE — 1090F PRES/ABSN URINE INCON ASSESS: CPT | Performed by: FAMILY MEDICINE

## 2024-02-19 PROCEDURE — 99214 OFFICE O/P EST MOD 30 MIN: CPT | Performed by: FAMILY MEDICINE

## 2024-02-19 PROCEDURE — G8484 FLU IMMUNIZE NO ADMIN: HCPCS | Performed by: FAMILY MEDICINE

## 2024-02-19 PROCEDURE — G8399 PT W/DXA RESULTS DOCUMENT: HCPCS | Performed by: FAMILY MEDICINE

## 2024-02-19 PROCEDURE — 1123F ACP DISCUSS/DSCN MKR DOCD: CPT | Performed by: FAMILY MEDICINE

## 2024-02-19 PROCEDURE — G8419 CALC BMI OUT NRM PARAM NOF/U: HCPCS | Performed by: FAMILY MEDICINE

## 2024-02-19 PROCEDURE — 3017F COLORECTAL CA SCREEN DOC REV: CPT | Performed by: FAMILY MEDICINE

## 2024-02-19 PROCEDURE — 72110 X-RAY EXAM L-2 SPINE 4/>VWS: CPT

## 2024-02-19 PROCEDURE — 83036 HEMOGLOBIN GLYCOSYLATED A1C: CPT | Performed by: FAMILY MEDICINE

## 2024-02-19 PROCEDURE — G8427 DOCREV CUR MEDS BY ELIG CLIN: HCPCS | Performed by: FAMILY MEDICINE

## 2024-02-19 ASSESSMENT — PATIENT HEALTH QUESTIONNAIRE - PHQ9
SUM OF ALL RESPONSES TO PHQ QUESTIONS 1-9: 0
SUM OF ALL RESPONSES TO PHQ QUESTIONS 1-9: 0
SUM OF ALL RESPONSES TO PHQ9 QUESTIONS 1 & 2: 0
SUM OF ALL RESPONSES TO PHQ QUESTIONS 1-9: 0
1. LITTLE INTEREST OR PLEASURE IN DOING THINGS: 0
SUM OF ALL RESPONSES TO PHQ QUESTIONS 1-9: 0
2. FEELING DOWN, DEPRESSED OR HOPELESS: 0

## 2024-02-19 ASSESSMENT — ANXIETY QUESTIONNAIRES
IF YOU CHECKED OFF ANY PROBLEMS ON THIS QUESTIONNAIRE, HOW DIFFICULT HAVE THESE PROBLEMS MADE IT FOR YOU TO DO YOUR WORK, TAKE CARE OF THINGS AT HOME, OR GET ALONG WITH OTHER PEOPLE: SOMEWHAT DIFFICULT
5. BEING SO RESTLESS THAT IT IS HARD TO SIT STILL: 0
6. BECOMING EASILY ANNOYED OR IRRITABLE: 0
4. TROUBLE RELAXING: 0
7. FEELING AFRAID AS IF SOMETHING AWFUL MIGHT HAPPEN: 0
1. FEELING NERVOUS, ANXIOUS, OR ON EDGE: 1
2. NOT BEING ABLE TO STOP OR CONTROL WORRYING: 0
3. WORRYING TOO MUCH ABOUT DIFFERENT THINGS: 1
GAD7 TOTAL SCORE: 2

## 2024-02-19 NOTE — PROGRESS NOTES
SUBJECTIVE  Chief Complaint   Patient presents with    Anxiety    Cholesterol Problem     Fatty liver     IFG    Migraine    Back Pain    Other     C/O right leg sciatica pain     Vitamin D Def.     Follow-up for lab review for hypercholesterolemia, anxiety, fatty liver, and hypovitaminosis D.      She went to the ER in [place] on feb 3rd for severe right sided lumbar radicular pains.  They medicated her and sent her home.   They gave her a prednisone course for 5 days.   Given hydrocodone / tylenol which she has since stopped.  She called us with persistent symptoms and we placed her on neurontin at night which has helped.  Still has pain down the back of the leg.  No weakness.  This is the first time she has had tingling in her foot.  She has had these episodes before but never any imaging.      Hypercholesterolemia - Taking Lipitor 40mg with no side effects.  No myalgias or cramping reported.  No chest pain or dyspnea.     The patient's anxiety is controlled on Effexor.  No side effects noted.    Takes Zomig as needed for rare migraines.  Has had complete neuro work-up without any further episodes of transient amnesia.     OBJECTIVE    Blood pressure 120/68, pulse 76, temperature 98.3 °F (36.8 °C), temperature source Temporal, resp. rate 16, height 1.6 m (5' 3\"), weight 74.4 kg (164 lb), SpO2 98 %.    General:  Alert, cooperative, well appearing, in no apparent distress.  Chest: clear, no w/r/r.  Heart: normal s1s2, RRR, no murmurs.  Back:  Nontender.  Positive SLR on the right.   Ext: no swelling present.   Psych: normal affect.  Mood good.  Oriented x 3.  Judgement and insight intact.     Results for orders placed or performed in visit on 02/19/24   AMB POC HEMOGLOBIN A1C   Result Value Ref Range    Hemoglobin A1C, POC 6.2 %       ASSESSMENT / PLAN   Diagnosis Orders   1. Impaired fasting glucose        2. Hyperlipidemia, unspecified hyperlipidemia type        3. Generalized anxiety disorder        4.

## 2024-02-19 NOTE — PATIENT INSTRUCTIONS

## 2024-02-19 NOTE — PROGRESS NOTES
Chief Complaint   Patient presents with    Anxiety    Cholesterol Problem     Fatty liver     IFG    Migraine    Back Pain    Other     C/O right leg sciatica pain     Vitamin D Def.      1. \"Have you been to the ER, urgent care clinic since your last visit?  Hospitalized since your last visit?\" Yes Urgent Care in [place] for sciatica attack     2. \"Have you seen or consulted any other health care providers outside of the [facility] since your last visit?\" No     3. For patients aged 45-75: Has the patient had a colonoscopy / FIT/ Cologuard? Yes - no Care Gap present due 08/07/2025      If the patient is female:    4. For patients aged 40-74: Has the patient had a mammogram within the past 2 years? Yes - Care Gap present. Most recent result on file last noted 06/09/2022      5. For patients aged 21-65: Has the patient had a pap smear? NA - based on age or sex

## 2024-02-20 ENCOUNTER — HOSPITAL ENCOUNTER (OUTPATIENT)
Facility: HOSPITAL | Age: 69
Setting detail: RECURRING SERIES
Discharge: HOME | End: 2024-02-23
Payer: MEDICARE

## 2024-02-20 PROCEDURE — 97162 PT EVAL MOD COMPLEX 30 MIN: CPT

## 2024-02-20 PROCEDURE — 97535 SELF CARE MNGMENT TRAINING: CPT

## 2024-02-20 PROCEDURE — 97110 THERAPEUTIC EXERCISES: CPT

## 2024-02-20 NOTE — PROGRESS NOTES
PHYSICAL / OCCUPATIONAL THERAPY - DAILY TREATMENT NOTE     Patient Name: Cydney Mercado    Date: 2024    : 1955  Insurance: Payor: MEDICARE / Plan: MEDICARE PART A AND B / Product Type: *No Product type* /      Patient  verified Yes     Visit #   Current / Total 1 24   Time   In / Out 1230 1:10   Pain   In / Out 4 4   Subjective Functional Status/Changes: The pt with c/o right sciatica    Changes to:  Allergies, Med Hx, Sx Hx?   no       TREATMENT AREA =  Other low back pain [M54.59]    OBJECTIVE        Therapeutic Procedures:  Tx Min Billable or 1:1 Min (if diff from Tx Min) Procedure, Rationale, Specifics   15  21481 Therapeutic Exercise (timed):  increase ROM, strength, coordination, balance, and proprioception to improve patient's ability to progress to PLOF and address remaining functional goals. (see flow sheet as applicable)    Details if applicable:  HEP     9  68567 Self Care/Home Management (timed):  improve patient knowledge and understanding of pain reducing techniques, positioning, activity modification, diagnosis/prognosis, and physical therapy expectations, procedures and progression  to improve patient's ability to progress to PLOF and address remaining functional goals.  (see flow sheet as applicable)    Details if applicable:                    24  [facility] Totals Reminder: bill using total billable min of TIMED therapeutic procedures (example: do not include dry needle or estim unattended, both untimed codes, in totals to left)  8-22 min = 1 unit; 23-37 min = 2 units; 38-52 min = 3 units; 53-67 min = 4 units; 68-82 min = 5 units   Total Total     TOTAL TREATMENT TIME:        40     [x]  Patient Education billed concurrently with other procedures   [x] Review HEP    [] Progressed/Changed HEP, detail:    [] Other detail:       Objective Information/Functional Measures/Assessment     See POC    Patient will continue to benefit from skilled PT / OT services to modify and progress

## 2024-02-20 NOTE — PROGRESS NOTES
[facility]  [address] [facility] [address] [place] [address] Ph:[phone] Fx: [phone]    PLAN OF CARE/ Statement of Necessity for Physical Therapy Services           Patient name: Cydney Mercado Start of Care: 2024   Referral source: Juliano Norris MD : 1955    Medical Diagnosis: Other low back pain [M54.59]       Onset Date: 2024   Treatment Diagnosis: M54.41  LUMBAGO WITH SCIATICA, RIGHT SIDE                                     Prior Hospitalization: see medical history Provider#: [id number]   Medications: Verified on Patient Summary List     Comorbidities: LBP, anxiety   Prior Level of Function: functionally I with all activities    The Plan of Care and following information is based on the information from the initial evaluation.    Assessment / key information:  69 y/o female presents with c/o LBP with right LE radicular symptoms. The pt reports her pain started 2/3/24 after prolonged standing in uncomfortable shoes. She reports pain radiates from the right glute down the right LE and tingling is reported in the right foot. The pt demonstrates limited lumbar AROM, limited B hip strength, limited core engagement and stability, + Slump test and SLR tests on the right, + myofascial tightness and tenderness of the right piriformis.  The pt will benefit from PT to address the aforementioned impairments.     Evaluation Complexity:  History:  MEDIUM  Complexity : 1-2 comorbidities / personal factors will impact the outcome/ POC ; Examination:  MEDIUM Complexity : 3 Standardized tests and measures addressin body structure, function, activity limitation and / or participation in recreation  ;Presentation:  MEDIUM Complexity : Evolving with changing characteristics  ;Clinical Decision Making:  MEDIUM Complexity : FOTO score of 26-74 FOTO score = an established functional score where 100 = no disability  Overall Complexity Rating: MEDIUM  Problem

## 2024-02-21 ENCOUNTER — HOSPITAL ENCOUNTER (OUTPATIENT)
Facility: HOSPITAL | Age: 69
Setting detail: RECURRING SERIES
Discharge: HOME | End: 2024-02-24
Payer: MEDICARE

## 2024-02-21 PROCEDURE — 97112 NEUROMUSCULAR REEDUCATION: CPT

## 2024-02-21 PROCEDURE — 97140 MANUAL THERAPY 1/> REGIONS: CPT

## 2024-02-21 PROCEDURE — 97110 THERAPEUTIC EXERCISES: CPT

## 2024-02-21 NOTE — PROGRESS NOTES
PHYSICAL / OCCUPATIONAL THERAPY - DAILY TREATMENT NOTE     Patient Name: Cydney Mercado    Date: 2024    : 1955  Insurance: Payor: MEDICARE / Plan: MEDICARE PART A AND B / Product Type: *No Product type* /      Patient  verified Yes     Visit #   Current / Total 2 24   Time   In / Out 9:05 9:50   Pain   In / Out 3 0   Subjective Functional Status/Changes: Pt states she has a little pain going down the back of her leg   Changes to:  Allergies, Med Hx, Sx Hx?   no       TREATMENT AREA =  Other low back pain [M54.59]    OBJECTIVE    Therapeutic Procedures:  Tx Min Billable or 1:1 Min (if diff from Tx Min) Procedure, Rationale, Specifics   27  90546 Therapeutic Exercise (timed):  increase ROM, strength, coordination, balance, and proprioception to improve patient's ability to progress to PLOF and address remaining functional goals. (see flow sheet as applicable)    Details if applicable:       10  84387 Neuromuscular Re-Education (timed):  improve balance, coordination, kinesthetic sense, posture, core stability and proprioception to improve patient's ability to develop conscious control of individual muscles and awareness of position of extremities in order to progress to PLOF and address remaining functional goals. (see flow sheet as applicable)    Details if applicable:     8  28114 Manual Therapy (timed):  decrease pain, increase ROM, and increase tissue extensibility to improve patient's ability to progress to PLOF and address remaining functional goals.  The manual therapy interventions were performed at a separate and distinct time from the therapeutic activities interventions . Details: Stick to right glutes, piri, and HS      Details if applicable:     45  [facility] Totals Reminder: bill using total billable min of TIMED therapeutic procedures (example: do not include dry needle or estim unattended, both untimed codes, in totals to left)  8-22 min = 1 unit; 23-37 min = 2 units; 38-52 min = 3 units;

## 2024-02-26 ENCOUNTER — HOSPITAL ENCOUNTER (OUTPATIENT)
Facility: HOSPITAL | Age: 69
Setting detail: RECURRING SERIES
Discharge: HOME | End: 2024-02-29
Payer: MEDICARE

## 2024-02-26 PROCEDURE — 97112 NEUROMUSCULAR REEDUCATION: CPT

## 2024-02-26 PROCEDURE — 97110 THERAPEUTIC EXERCISES: CPT

## 2024-02-26 PROCEDURE — 97140 MANUAL THERAPY 1/> REGIONS: CPT

## 2024-02-26 NOTE — PROGRESS NOTES
PHYSICAL / OCCUPATIONAL THERAPY - DAILY TREATMENT NOTE     Patient Name: Cydney Mercado    Date: 2024    : 1955  Insurance: Payor: MEDICARE / Plan: MEDICARE PART A AND B / Product Type: *No Product type* /      Patient  verified Yes     Visit #   Current / Total 3 24   Time   In / Out 9:00 9:41   Pain   In / Out 3 1   Subjective Functional Status/Changes: Pt states she did good after last visit   Changes to:  Allergies, Med Hx, Sx Hx?   no       TREATMENT AREA =  Lumbago with sciatica, right side [M54.41]    OBJECTIVE    Therapeutic Procedures:  Tx Min Billable or 1:1 Min (if diff from Tx Min) Procedure, Rationale, Specifics   23  66677 Therapeutic Exercise (timed):  increase ROM, strength, coordination, balance, and proprioception to improve patient's ability to progress to PLOF and address remaining functional goals. (see flow sheet as applicable)    Details if applicable:       10  58115 Neuromuscular Re-Education (timed):  improve balance, coordination, kinesthetic sense, posture, core stability and proprioception to improve patient's ability to develop conscious control of individual muscles and awareness of position of extremities in order to progress to PLOF and address remaining functional goals. (see flow sheet as applicable)    Details if applicable:     8  64304 Manual Therapy (timed):  decrease pain, increase ROM, and increase tissue extensibility to improve patient's ability to progress to PLOF and address remaining functional goals.  The manual therapy interventions were performed at a separate and distinct time from the therapeutic activities interventions . Details: Stick to right glutes, piri, and HS        Details if applicable:     41  [facility] Totals Reminder: bill using total billable min of TIMED therapeutic procedures (example: do not include dry needle or estim unattended, both untimed codes, in totals to left)  8-22 min = 1 unit; 23-37 min = 2 units; 38-52 min = 3 units; 53-67

## 2024-02-28 ENCOUNTER — HOSPITAL ENCOUNTER (OUTPATIENT)
Facility: HOSPITAL | Age: 69
Setting detail: RECURRING SERIES
Discharge: HOME | End: 2024-03-02
Payer: MEDICARE

## 2024-02-28 PROCEDURE — 97110 THERAPEUTIC EXERCISES: CPT

## 2024-02-28 PROCEDURE — 97112 NEUROMUSCULAR REEDUCATION: CPT

## 2024-02-28 PROCEDURE — 97140 MANUAL THERAPY 1/> REGIONS: CPT

## 2024-02-28 NOTE — PROGRESS NOTES
left)  8-22 min = 1 unit; 23-37 min = 2 units; 38-52 min = 3 units; 53-67 min = 4 units; 68-82 min = 5 units   Total Total     TOTAL TREATMENT TIME:     44     [x]  Patient Education billed concurrently with other procedures   [x] Review HEP    [] Progressed/Changed HEP, detail:    [] Other detail:       Objective Information/Functional Measures/Assessment  Progressed resistance with Trap Bar core march. Adjusted HSS to the 1st step to reduce neural tension. The pt reports benefit of PT but limited change in radicular symptoms. Decreased pain noted post treatment.         Patient will continue to benefit from skilled PT / OT services to modify and progress therapeutic interventions, analyze and address functional mobility deficits, analyze and address ROM deficits, analyze and address strength deficits, analyze and address soft tissue restrictions, analyze and cue for proper movement patterns, analyze and modify for postural abnormalities, and analyze and address imbalance/dizziness to address functional deficits and attain remaining goals.    Progress toward goals / Updated goals:  [x]  See Progress Note/Recertification    Short Term Goals: To be accomplished in 4 weeks  The pt will be I and compliant with HEP   IE- issued HEP   Current: Pt reports compliance 2/21/2024  2. The pt will report 50% reduction in right LE pain to improve ability for walking.              IE- 0    Current: limited change in right LE pain  on 2/28/24  Long Term Goals: To be accomplished in 12 weeks  The pt will report being able to walk more than a mile without limitation.   IE- limited a  little   2. The pt will report no limitation with performing moderate home activities, like vacuuming.              IE- limited a lot  3. The pt will demonstrate 4+/5 B glute max MMT to improve ease with standing and daily activities.               IE- right 4-/5, left 4/5  4. The pt will demonstrate 75% of full lumbar flexion without pain increase for

## 2024-03-04 ENCOUNTER — HOSPITAL ENCOUNTER (OUTPATIENT)
Facility: HOSPITAL | Age: 69
Setting detail: RECURRING SERIES
Discharge: HOME | End: 2024-03-07
Payer: MEDICARE

## 2024-03-04 PROCEDURE — 97112 NEUROMUSCULAR REEDUCATION: CPT

## 2024-03-04 PROCEDURE — 97530 THERAPEUTIC ACTIVITIES: CPT

## 2024-03-04 PROCEDURE — 97110 THERAPEUTIC EXERCISES: CPT

## 2024-03-04 NOTE — PROGRESS NOTES
PHYSICAL / OCCUPATIONAL THERAPY - DAILY TREATMENT NOTE     Patient Name: Cydney Mercado    Date: 3/4/2024    : 1955  Insurance: Payor: MEDICARE / Plan: MEDICARE PART A AND B / Product Type: *No Product type* /      Patient  verified Yes     Visit #   Current / Total 2 16   Time   In / Out 1151 1230   Pain   In / Out 2 1   Subjective Functional Status/Changes: Patient reports feeling pretty good today. Some right leg pain present today.   Changes to:  Allergies, Med Hx, Sx Hx?   no       TREATMENT AREA =  Lumbago with sciatica, right side [M54.41]    OBJECTIVE    908    Therapeutic Procedures:  Tx Min Billable or 1:1 Min (if diff from Tx Min) Procedure, Rationale, Specifics   17  [id number] Therapeutic Exercise (timed):  increase ROM, strength, coordination, balance, and proprioception to improve patient's ability to progress to PLOF and address remaining functional goals. (see flow sheet as applicable)    Details if applicable:       12  34215 Neuromuscular Re-Education (timed):  improve balance, coordination, kinesthetic sense, posture, core stability and proprioception to improve patient's ability to develop conscious control of individual muscles and awareness of position of extremities in order to progress to PLOF and address remaining functional goals. (see flow sheet as applicable)    Details if applicable:     10  21211 Therapeutic Activity (timed):  use of dynamic activities replicating functional movements to increase ROM, strength, coordination, balance, and proprioception in order to improve patient's ability to progress to PLOF and address remaining functional goals.  (see flow sheet as applicable)     Details if applicable:           Details if applicable:            Details if applicable:     39  [facility] Totals Reminder: bill using total billable min of TIMED therapeutic procedures (example: do not include dry needle or estim unattended, both untimed codes, in totals to left)  8-22 min = 1 unit;

## 2024-03-06 ENCOUNTER — HOSPITAL ENCOUNTER (OUTPATIENT)
Facility: HOSPITAL | Age: 69
Setting detail: RECURRING SERIES
Discharge: HOME | End: 2024-03-09
Payer: MEDICARE

## 2024-03-06 PROCEDURE — 97112 NEUROMUSCULAR REEDUCATION: CPT

## 2024-03-06 PROCEDURE — 97110 THERAPEUTIC EXERCISES: CPT

## 2024-03-06 PROCEDURE — 97012 MECHANICAL TRACTION THERAPY: CPT

## 2024-03-06 NOTE — PROGRESS NOTES
PHYSICAL / OCCUPATIONAL THERAPY - DAILY TREATMENT NOTE     Patient Name: Cydney Mercado    Date: 3/6/2024    : 1955  Insurance: Payor: MEDICARE / Plan: MEDICARE PART A AND B / Product Type: *No Product type* /      Patient  verified Yes     Visit #   Current / Total 6 24   Time   In / Out 1110 1211   Pain   In / Out 3 1   Subjective Functional Status/Changes: The pt reports she is much better from onset but still having some pain. She reports her pain primarily seems to be the nerve.    Changes to:  Allergies, Med Hx, Sx Hx?   no       TREATMENT AREA =  Lumbago with sciatica, right side [M54.41]    OBJECTIVE    Modalities Rationale:     decrease pain and increase tissue extensibility to improve patient's ability to progress to PLOF and address remaining functional goals.     min [] Estim Unattended, type/location:                                      []  w/ice    []  w/heat    min [] Estim Attended, type/location:                                     []  w/US     []  w/ice    []  w/heat    []  TENS insruct     10 min [x]  Mechanical Traction: type/lbs 70/30                  []  pro   [x]  sup   [x]  int   []  cont    []  before manual    []  after manual    min []  Ultrasound, settings/location:      min []  Iontophoresis w/ dexamethasone, location:                                               []  take home patch       []  in clinic        min  unbilled []  Ice     []  Heat    location/position:     min []  Paraffin,  details:     min []  Vasopneumatic Device, press/temp:     min []  Whirlpool / Fluido:    If using vaso (only need to measure limb vaso being performed on)      pre-treatment girth :       post-treatment girth :       measured at (landmark location) :      min []  Other:    Skin assessment post-treatment (if applicable):    []  intact    []  redness- no adverse reaction                 []redness - adverse reaction:         Therapeutic Procedures:  Tx Min Billable or 1:1 Min (if diff from Tx

## 2024-03-11 ENCOUNTER — HOSPITAL ENCOUNTER (OUTPATIENT)
Facility: HOSPITAL | Age: 69
Setting detail: RECURRING SERIES
Discharge: HOME | End: 2024-03-14
Payer: MEDICARE

## 2024-03-11 PROCEDURE — 97012 MECHANICAL TRACTION THERAPY: CPT

## 2024-03-11 PROCEDURE — 97110 THERAPEUTIC EXERCISES: CPT

## 2024-03-11 PROCEDURE — 97112 NEUROMUSCULAR REEDUCATION: CPT

## 2024-03-11 NOTE — PROGRESS NOTES
PHYSICAL / OCCUPATIONAL THERAPY - DAILY TREATMENT NOTE     Patient Name: Cydney Mercado    Date: 3/11/2024    : 1955  Insurance: Payor: MEDICARE / Plan: MEDICARE PART A AND B / Product Type: *No Product type* /      Patient  verified Yes     Visit #   Current / Total 7 24   Time   In / Out 11:10 12:04   Pain   In / Out 2/10 0/10   Subjective Functional Status/Changes: \"I've been sleeping better.\"   Changes to:  Allergies, Med Hx, Sx Hx?   no       TREATMENT AREA =  Lumbago with sciatica, right side [M54.41]    OBJECTIVE    Modalities Rationale:     decrease pain and increase tissue extensibility to improve patient's ability to progress to PLOF and address remaining functional goals.     min [] Estim Unattended, type/location:                                      []  w/ice    []  w/heat    min [] Estim Attended, type/location:                                     []  w/US     []  w/ice    []  w/heat    []  TENS insruct     10 min [x]  Mechanical Traction: type/lbs  70/30lbs                  []  pro   [x]  sup   [x]  int   []  cont    []  before manual    []  after manual    min []  Ultrasound, settings/location:      min []  Iontophoresis w/ dexamethasone, location:                                               []  take home patch       []  in clinic        min  unbilled []  Ice     []  Heat    location/position:     min []  Paraffin,  details:     min []  Vasopneumatic Device, press/temp:     min []  Whirlpool / Fluido:    If using vaso (only need to measure limb vaso being performed on)      pre-treatment girth :       post-treatment girth :       measured at (landmark location) :      min []  Other:    Skin assessment post-treatment (if applicable):    []  intact    []  redness- no adverse reaction                 []redness - adverse reaction:         Therapeutic Procedures:  Tx Min Billable or 1:1 Min (if diff from Tx Min) Procedure, Rationale, Specifics   [phone] [id number] Therapeutic Exercise (timed):

## 2024-03-13 ENCOUNTER — HOSPITAL ENCOUNTER (OUTPATIENT)
Facility: HOSPITAL | Age: 69
Setting detail: RECURRING SERIES
Discharge: HOME | End: 2024-03-16
Payer: MEDICARE

## 2024-03-13 PROCEDURE — 97110 THERAPEUTIC EXERCISES: CPT

## 2024-03-13 PROCEDURE — 97012 MECHANICAL TRACTION THERAPY: CPT

## 2024-03-13 PROCEDURE — 97112 NEUROMUSCULAR REEDUCATION: CPT

## 2024-03-13 NOTE — PROGRESS NOTES
goals / Updated goals:  []  See Progress Note/Recertification    Short Term Goals: To be accomplished in 4 weeks  The pt will be I and compliant with HEP   IE- issued HEP   Current: Pt reports compliance 2/21/2024  2. The pt will report 50% reduction in right LE pain to improve ability for walking.              IE- 0              Current: improving 3/13/24  Long Term Goals: To be accomplished in 12 weeks  The pt will report being able to walk more than a mile without limitation.   IE- limited a  little   Current: pt reports not much difficulty - 3/6/24  2. The pt will report no limitation with performing moderate home activities, like vacuuming.              IE- limited a lot  3. The pt will demonstrate 4+/5 B glute max MMT to improve ease with standing and daily activities.               IE- right 4-/5, left 4/5   Current: right 4-/5, left 4/5 on 3/13/24  4. The pt will demonstrate 75% of full lumbar flexion without pain increase for ease with ADLs              IE- 50% with pain              Current: 60% of full with pain on 3/6/24  5. The pt will report at least 75% reduction in right LE pain to improve ability for walking.              IE- 0   Current: Progressing, 20%. 3/11/2024    PLAN  Yes  Continue plan of care  []  Upgrade activities as tolerated  []  Discharge due to :  []  Other:    Ever Chung, PT    3/13/2024    11:56 AM    No future appointments.

## 2024-03-19 ENCOUNTER — HOSPITAL ENCOUNTER (OUTPATIENT)
Facility: HOSPITAL | Age: 69
Setting detail: RECURRING SERIES
Discharge: HOME | End: 2024-03-22
Payer: MEDICARE

## 2024-03-19 PROCEDURE — 97012 MECHANICAL TRACTION THERAPY: CPT

## 2024-03-19 PROCEDURE — 97112 NEUROMUSCULAR REEDUCATION: CPT

## 2024-03-19 PROCEDURE — 97110 THERAPEUTIC EXERCISES: CPT

## 2024-03-19 NOTE — PROGRESS NOTES
ability for walking.              IE- 0              PN: Progressing, 20%.    Summary of Care/ Key Functional Changes:   FOTO 57%. Pt reports a little bit limited with performing moderate home activities.     ASSESSMENT/RECOMMENDATIONS:  Pt stated \"I think everything is helping a little bit.\" Pt demonstrated improved trunk flexion mobility and HS flexibility with stretches. Pt reports Right LE radiculopathy has improved but still present. Pins/needles in Right foot. Recommend continue PT to further improve pt's ability to perform ADL's and functional activities.     Continue per plan of care.     Thank you for this referral.   Malik Hazel, PTA 3/19/2024 4:07 PM    
patterns to address functional deficits and attain remaining goals.    Progress toward goals / Updated goals:  [x]  See Progress Note/Recertification    Short Term Goals: To be accomplished in 4 weeks  The pt will be I and compliant with HEP   IE- issued HEP   Current: Pt reports compliance 2/21/2024  2. The pt will report 50% reduction in right LE pain to improve ability for walking.              IE- 0              Current: improving 3/13/24  Long Term Goals: To be accomplished in 12 weeks  The pt will report being able to walk more than a mile without limitation.   IE- limited a  little   Current: pt reports not much difficulty - 3/6/24  2. The pt will report no limitation with performing moderate home activities, like vacuuming.              IE- limited a lot   Current: Pt reports a little bit limited with performing moderate home activities. 3/19/2024  3. The pt will demonstrate 4+/5 B glute max MMT to improve ease with standing and daily activities.               IE- right 4-/5, left 4/5              Current: right 4-/5, left 4/5 on 3/13/24  4. The pt will demonstrate 75% of full lumbar flexion without pain increase for ease with ADLs              IE- 50% with pain              Current: 60% of full with pain on 3/6/24  5. The pt will report at least 75% reduction in right LE pain to improve ability for walking.              IE- 0              Current: Progressing, 20%. 3/11/2024    PLAN  Yes  Continue plan of care  []  Upgrade activities as tolerated  []  Discharge due to:  []  Other:    Malik Hazel PTA    3/19/2024    2:52 PM    Future Appointments   Date Time Provider Department Center   3/19/2024  3:10 PM [facility]   3/22/2024  8:30 AM Malik Hazel PTA [facility] [place]   3/25/2024 11:10 AM Malik Hazel PTA [facility] [place]   3/28/2024 11:50 AM Malik Hazel PTA [facility] [place]   4/1/2024 11:50 AM Malik Hazel PTA [facility]

## 2024-03-22 ENCOUNTER — HOSPITAL ENCOUNTER (OUTPATIENT)
Facility: HOSPITAL | Age: 69
Setting detail: RECURRING SERIES
Discharge: HOME | End: 2024-03-25
Payer: MEDICARE

## 2024-03-22 PROCEDURE — 97110 THERAPEUTIC EXERCISES: CPT

## 2024-03-22 PROCEDURE — 97012 MECHANICAL TRACTION THERAPY: CPT

## 2024-03-22 PROCEDURE — 97112 NEUROMUSCULAR REEDUCATION: CPT

## 2024-03-22 NOTE — PROGRESS NOTES
strength, coordination, balance, and proprioception to improve patient's ability to progress to PLOF and address remaining functional goals. (see flow sheet as applicable)    Details if applicable:       12  64958 Neuromuscular Re-Education (timed):  improve balance, coordination, kinesthetic sense, posture, core stability and proprioception to improve patient's ability to develop conscious control of individual muscles and awareness of position of extremities in order to progress to PLOF and address remaining functional goals. (see flow sheet as applicable)    Details if applicable:  Trapeze bar series, kei pull downs, 1/2 prone glute series.     39  [facility] Totals Reminder: bill using total billable min of TIMED therapeutic procedures (example: do not include dry needle or estim unattended, both untimed codes, in totals to left)  8-22 min = 1 unit; 23-37 min = 2 units; 38-52 min = 3 units; 53-67 min = 4 units; 68-82 min = 5 units   Total Total     TOTAL TREATMENT TIME:        49     [x]  Patient Education billed concurrently with other procedures   [x] Review HEP    [] Progressed/Changed HEP, detail:    [] Other detail:       Objective Information/Functional Measures/Assessment    Cueing to maintain TA recruitment and neutral spine with exercises. Pt expressed good tolerance with exercises, denied pain increasing with exercises.    Patient will continue to benefit from skilled PT / OT services to modify and progress therapeutic interventions, analyze and address functional mobility deficits, analyze and address ROM deficits, analyze and address strength deficits, analyze and address soft tissue restrictions, and analyze and cue for proper movement patterns to address functional deficits and attain remaining goals.    Progress toward goals / Updated goals:  []  See Progress Note/Recertification    Short Term Goals: To be accomplished in 4 weeks  The pt will be I and compliant with HEP   IE- issued HEP   PN: Pt

## 2024-03-25 ENCOUNTER — HOSPITAL ENCOUNTER (OUTPATIENT)
Facility: HOSPITAL | Age: 69
Setting detail: RECURRING SERIES
Discharge: HOME | End: 2024-03-28
Payer: MEDICARE

## 2024-03-25 PROCEDURE — 97112 NEUROMUSCULAR REEDUCATION: CPT

## 2024-03-25 PROCEDURE — 97110 THERAPEUTIC EXERCISES: CPT

## 2024-03-25 PROCEDURE — 97012 MECHANICAL TRACTION THERAPY: CPT

## 2024-03-25 NOTE — PROGRESS NOTES
PHYSICAL / OCCUPATIONAL THERAPY - DAILY TREATMENT NOTE     Patient Name: Cydney Mercado    Date: 3/25/2024    : 1955  Insurance: Payor: MEDICARE / Plan: MEDICARE PART A AND B / Product Type: *No Product type* /      Patient  verified Yes     Visit #   Current / Total 11 24   Time   In / Out 11:10 12:00   Pain   In / Out 2/10 0/10   Subjective Functional Status/Changes: \"Doing better, I think it's just going to take time.\"   Changes to:  Allergies, Med Hx, Sx Hx?   no       TREATMENT AREA =  Lumbago with sciatica, right side [M54.41]    OBJECTIVE    Modalities Rationale:     decrease pain and increase tissue extensibility to improve patient's ability to progress to PLOF and address remaining functional goals.     min [] Estim Unattended, type/location:                                      []  w/ice    []  w/heat    min [] Estim Attended, type/location:                                     []  w/US     []  w/ice    []  w/heat    []  TENS insruct     10 min [x]  Mechanical Traction: type/lbs  75#/40#                  []  pro   [x]  sup   [x]  int   []  cont    []  before manual    []  after manual    min []  Ultrasound, settings/location:      min []  Iontophoresis w/ dexamethasone, location:                                               []  take home patch       []  in clinic        min  unbilled []  Ice     []  Heat    location/position:     min []  Paraffin,  details:     min []  Vasopneumatic Device, press/temp:     min []  Whirlpool / Fluido:    If using vaso (only need to measure limb vaso being performed on)      pre-treatment girth :       post-treatment girth :       measured at (landmark location) :      min []  Other:    Skin assessment post-treatment (if applicable):    []  intact    []  redness- no adverse reaction                 []redness - adverse reaction:         Therapeutic Procedures:  Tx Min Billable or 1:1 Min (if diff from Tx Min) Procedure, Rationale, Specifics   28  63030 Therapeutic

## 2024-03-28 ENCOUNTER — APPOINTMENT (OUTPATIENT)
Facility: HOSPITAL | Age: 69
End: 2024-03-28
Payer: MEDICARE

## 2024-03-28 NOTE — PROGRESS NOTES
- continue fexofenadine   SUBJECTIVE  Chief Complaint   Patient presents with    Cholesterol Problem    Anxiety    Vitamin D Deficiency    Other     IFG/prediabetes    Hip Injury     fell x 1 month ago and injured left hip; taking Ibuprofen 200 mg-3 tabs daily      Follow-up for lab review for hypercholesterolemia, anxiety, fatty liver, and hypovitaminosis D. She fell about 1 month ago and has had left hip pain. She has had 80 percent improvement but still feels it looks swollen laterally. Walking without a limp. Diet changes have been effective. Has known fatty liver. Hypercholesterolemia - Taking Lipitor 40mg with no side effects. No myalgias or cramping reported. No chest pain or dyspnea. Has had a heart murmur. No history of ECHO. Has had occasional dizziness with standing up. The patient's anxiety is controlled on Effexor. Takes Zomig as needed for rare migraines. Says she gets these about 1 per month. OBJECTIVE    Blood pressure 112/64, pulse 74, temperature 97.5 °F (36.4 °C), temperature source Temporal, resp. rate 14, height 5' 3\" (1.6 m), weight 161 lb (73 kg), SpO2 95 %. General:  Alert, cooperative, well appearing, in no apparent distress. Chest: clear, no w/r/r. Heart: normal s1s2, RRR, no murmurs. Left hip:  Tenderness of lateral hip. Minimal swelling and feels lumpy like a hematoma that has solidified. Left leg appears shorter (says her chiropractor has told her similar). No pain on ROM testing. Gait is normal.   Ext: no swelling present. Psych: normal affect. Mood good. Oriented x 3. Judgement and insight intact. Results for orders placed or performed in visit on 02/18/21   AMB POC HEMOGLOBIN A1C   Result Value Ref Range    Hemoglobin A1c (POC) 5.6 %       ASSESSMENT / PLAN    ICD-10-CM ICD-9-CM    1. IFG (impaired fasting glucose)  R73.01 790.21 AMB POC HEMOGLOBIN A1C      HEMOGLOBIN A1C WITH EAG   2.  Hyperlipidemia with target LDL less than 130  E78.5 272.4 LIPID PANEL 3. Anxiety  F41.9 300.00    4. Vitamin D deficiency  E55.9 268.9    5. Fatty liver  K76.0 571.8 CBC WITH AUTOMATED DIFF      METABOLIC PANEL, COMPREHENSIVE   6. Other migraine without status migrainosus, not intractable  G43.809 346.80    7. Obesity, unspecified classification, unspecified obesity type, unspecified whether serious comorbidity present  E66.9 278.00    8. Left hip pain  M25.552 719.45 XR HIP LT W OR WO PELV 2-3 VWS   9. Heart murmur  R01.1 785.2 ECHO ADULT FOLLOW-UP OR LIMITED   10. Dizziness  R42 780.4 ECHO ADULT FOLLOW-UP OR LIMITED     IFG - reviewed A1c. Advised on exercise and carb reduction. Advised on checking A1c in 6 months. Hyperlipidemia - Continue Lipitor 40mg nightly. No hepatotoxicity. Encourage diet and exercise. Anxiety - Controlled. Continue Effexor. Refills as needed. Vit D def - controlled on vitamin D 50,000 weekly. Migraines - Zomig as needed for migraines. Obesity / fatty liver -  Encourage diet and exercise. Discussed various strategies for weight loss upon her request.    Left hip pain - x-rays ordered. Heart murmur / dizziness - 2D echo. Advised on importance of CPE / well woman exam with OB/GYN. All chart history elements were reviewed by me at the time of the visit even though marked at time of note closure. Patient understands our medical plan. Patient has provided input and agrees with goals. Alternatives have been explained and offered. All questions answered. The patient is to call if condition worsens or fails to improve. Follow-up and Dispositions    · Return in about 6 months (around 8/18/2021) for routine care and fasting labs to be done 3-5 days prior, x-ray today and 2D Echo to be scheduled .

## 2024-04-01 ENCOUNTER — HOSPITAL ENCOUNTER (OUTPATIENT)
Facility: HOSPITAL | Age: 69
Setting detail: RECURRING SERIES
Discharge: HOME | End: 2024-04-04
Payer: MEDICARE

## 2024-04-01 PROCEDURE — 97112 NEUROMUSCULAR REEDUCATION: CPT

## 2024-04-01 PROCEDURE — 97012 MECHANICAL TRACTION THERAPY: CPT

## 2024-04-01 PROCEDURE — 97110 THERAPEUTIC EXERCISES: CPT

## 2024-04-01 NOTE — PROGRESS NOTES
strength, coordination, balance, and proprioception to improve patient's ability to progress to PLOF and address remaining functional goals. (see flow sheet as applicable)    Details if applicable:       13  39026 Neuromuscular Re-Education (timed):  improve balance, coordination, kinesthetic sense, posture, core stability and proprioception to improve patient's ability to develop conscious control of individual muscles and awareness of position of extremities in order to progress to PLOF and address remaining functional goals. (see flow sheet as applicable)    Details if applicable:  Trapeze bar series, kei pull downs, 1/2 prone glute series, squats.   41  [facility] Totals Reminder: bill using total billable min of TIMED therapeutic procedures (example: do not include dry needle or estim unattended, both untimed codes, in totals to left)  8-22 min = 1 unit; 23-37 min = 2 units; 38-52 min = 3 units; 53-67 min = 4 units; 68-82 min = 5 units   Total Total     TOTAL TREATMENT TIME:        51     [x]  Patient Education billed concurrently with other procedures   [x] Review HEP    [] Progressed/Changed HEP, detail:    [] Other detail:       Objective Information/Functional Measures/Assessment    AROM Lumbar flexion 75% without pain. Added squats, fairly good form however required VC's to perform less trunk flexion at bottom range of squat. Pt reports having significant improvement in LBP and Right LE symptoms.    Patient will continue to benefit from skilled PT / OT services to modify and progress therapeutic interventions, analyze and address functional mobility deficits, analyze and address ROM deficits, analyze and address strength deficits, analyze and address soft tissue restrictions, and analyze and cue for proper movement patterns to address functional deficits and attain remaining goals.    Progress toward goals / Updated goals:  []  See Progress Note/Recertification    Short Term Goals: To be accomplished in 4

## 2024-04-04 ENCOUNTER — HOSPITAL ENCOUNTER (OUTPATIENT)
Facility: HOSPITAL | Age: 69
Setting detail: RECURRING SERIES
Discharge: HOME | End: 2024-04-07
Payer: MEDICARE

## 2024-04-04 PROCEDURE — 97112 NEUROMUSCULAR REEDUCATION: CPT

## 2024-04-04 PROCEDURE — 97110 THERAPEUTIC EXERCISES: CPT

## 2024-04-04 PROCEDURE — 97012 MECHANICAL TRACTION THERAPY: CPT

## 2024-04-04 NOTE — PROGRESS NOTES
Pt reports compliance  2. The pt will report 50% reduction in right LE pain to improve ability for walking.              IE- 0              PN: improving              Current: Met, 60%. 3/25/2024  Long Term Goals: To be accomplished in 12 weeks  The pt will report being able to walk more than a mile without limitation.   IE- limited a  little   PN: pt reports not much difficulty  Current: Pt reports she would be able to walk a mile without limitations. 4/4/2024  2. The pt will report no limitation with performing moderate home activities, like vacuuming.              IE- limited a lot              PN: Pt reports a little bit limited with performing moderate home activities.  3. The pt will demonstrate 4+/5 B glute max MMT to improve ease with standing and daily activities.               IE- right 4-/5, left 4/5              PN: right 4-/5, left 4/5  4. The pt will demonstrate 75% of full lumbar flexion without pain increase for ease with ADLs              IE- 50% with pain              PN: 60% of full with pain.              Current: 75% of full without pain. 4/1/2024  5. The pt will report at least 75% reduction in right LE pain to improve ability for walking.              IE- 0              PN: Progressing, 20%.    PLAN  Yes  Continue plan of care  []  Upgrade activities as tolerated  []  Discharge due to :  []  Other:    Malik Hazel PTA    4/4/2024    11:55 AM    Future Appointments   Date Time Provider Department Center   4/4/2024  3:45 PM Juliano Norris MD [facility]   4/8/2024 11:50 AM Malik Hazel PTA [facility]   4/16/2024 11:10 AM Ever Chung PT [facility]

## 2024-04-08 ENCOUNTER — HOSPITAL ENCOUNTER (OUTPATIENT)
Facility: HOSPITAL | Age: 69
Setting detail: RECURRING SERIES
Discharge: HOME | End: 2024-04-11
Payer: MEDICARE

## 2024-04-08 PROCEDURE — 97112 NEUROMUSCULAR REEDUCATION: CPT

## 2024-04-08 PROCEDURE — 97110 THERAPEUTIC EXERCISES: CPT

## 2024-04-08 PROCEDURE — 97012 MECHANICAL TRACTION THERAPY: CPT

## 2024-04-08 RX ORDER — ATORVASTATIN CALCIUM 40 MG/1
TABLET, FILM COATED ORAL
Qty: 90 TABLET | Refills: 3 | Status: SHIPPED | OUTPATIENT
Start: 2024-04-08

## 2024-04-08 NOTE — PROGRESS NOTES
PHYSICAL / OCCUPATIONAL THERAPY - DAILY TREATMENT NOTE     Patient Name: Cydney Mercado    Date: 2024    : 1955  Insurance: Payor: MEDICARE / Plan: MEDICARE PART A AND B / Product Type: *No Product type* /      Patient  verified Yes     Visit #   Current / Total 14 24   Time   In / Out 11:48 12:40   Pain   In / Out 1/10 0/10   Subjective Functional Status/Changes: \"Just a little pain in my leg.\"   Changes to:  Allergies, Med Hx, Sx Hx?   no       TREATMENT AREA =  Lumbago with sciatica, right side [M54.41]    OBJECTIVE    Modalities Rationale:     decrease pain and increase tissue extensibility to improve patient's ability to progress to PLOF and address remaining functional goals.     min [] Estim Unattended, type/location:                                      []  w/ice    []  w/heat    min [] Estim Attended, type/location:                                     []  w/US     []  w/ice    []  w/heat    []  TENS insruct     10 min [x]  Mechanical Traction: type/lbs 75#/40#                   []  pro   [x]  sup   [x]  int   []  cont    []  before manual    []  after manual    min []  Ultrasound, settings/location:      min []  Iontophoresis w/ dexamethasone, location:                                               []  take home patch       []  in clinic        min  unbilled []  Ice     []  Heat    location/position:     min []  Paraffin,  details:     min []  Vasopneumatic Device, press/temp:     min []  Whirlpool / Fluido:    If using vaso (only need to measure limb vaso being performed on)      pre-treatment girth :       post-treatment girth :       measured at (landmark location) :      min []  Other:    Skin assessment post-treatment (if applicable):    []  intact    []  redness- no adverse reaction                 []redness - adverse reaction:         Therapeutic Procedures:  Tx Min Billable or 1:1 Min (if diff from Tx Min) Procedure, Rationale, Specifics   30  [id number] Therapeutic Exercise (timed):

## 2024-04-16 ENCOUNTER — HOSPITAL ENCOUNTER (OUTPATIENT)
Facility: HOSPITAL | Age: 69
Setting detail: RECURRING SERIES
Discharge: HOME | End: 2024-04-19
Payer: MEDICARE

## 2024-04-16 PROCEDURE — 97012 MECHANICAL TRACTION THERAPY: CPT

## 2024-04-16 PROCEDURE — 97112 NEUROMUSCULAR REEDUCATION: CPT

## 2024-04-16 PROCEDURE — 97110 THERAPEUTIC EXERCISES: CPT

## 2024-04-16 NOTE — PROGRESS NOTES
Physical Therapy Discharge Instructions      [facility]  [address]  [place] [address] [phone] fax    Patient: Cydney Mercado  : 1955      Continue Home Exercise Program 1 times per day for 2 weeks, then decrease to 3 times per week      Continue with    [x] Ice  as needed 2 times per day     [x] Heat           Follow up with MD:     [] Upon completion of therapy     [x] As needed      Recommendations:     [x]   Return to activity with home program    []   Return to activity with the following modifications:       []Post Rehab Program    []Join Independent aquatic program     []Return to/join local gym        Additional Comments:            Ever Chung, PT 2024 12:16 PM    
accomplished in 12 weeks  The pt will report being able to walk more than a mile without limitation.   IE- limited a  little   PN: pt reports not much difficulty  Current: MET goal 4/15/24  2. The pt will report no limitation with performing moderate home activities, like vacuuming.              IE- limited a lot              PN: Pt reports a little bit limited with performing moderate home activities.              Current: Met, pt reports no limitation. 4/8/2024  3. The pt will demonstrate 4+/5 B glute max MMT to improve ease with standing and daily activities.               IE- right 4-/5, left 4/5              PN: right 4-/5, left 4/5   Current: MET for left 4+/5, right 4/5   4. The pt will demonstrate 75% of full lumbar flexion without pain increase for ease with ADLs              IE- 50% with pain              PN: 60% of full with pain.              Current: MET 75% of full without pain. 4/1/2024  5. The pt will report at least 75% reduction in right LE pain to improve ability for walking.              IE- 0              PN: Progressing, 20%.   Current: MET 80% reported on 4/16/24    RECOMMENDATIONS  Discontinue therapy. Progressing towards or have reached established goals.    Ever Chung, PT       4/16/2024       11:51 AM    Payor: MEDICARE / Plan: MEDICARE PART A AND B / Product Type: *No Product type* /      Not Medicaid Ins, no signature required for DC

## 2024-05-03 DIAGNOSIS — F41.1 GENERALIZED ANXIETY DISORDER: ICD-10-CM

## 2024-05-03 RX ORDER — VENLAFAXINE HYDROCHLORIDE 75 MG/1
CAPSULE, EXTENDED RELEASE ORAL
Qty: 90 CAPSULE | Refills: 0 | Status: SHIPPED | OUTPATIENT
Start: 2024-05-03

## 2024-05-03 RX ORDER — VENLAFAXINE HYDROCHLORIDE 37.5 MG/1
37.5 CAPSULE, EXTENDED RELEASE ORAL NIGHTLY
Qty: 90 CAPSULE | Refills: 0 | Status: SHIPPED | OUTPATIENT
Start: 2024-05-03

## 2024-05-03 NOTE — TELEPHONE ENCOUNTER
This pharmacy faxed over request for the following prescriptions to be filled:    Medication requested : venlafaxine (EFFEXOR XR) 37.5 MG extended release capsule QTY 90   PCP: Myrna  Pharmacy or Print:  [facility]   Mail order or Local pharmacy [address]     Scheduled appointment if not seen by current providers in office: Lov 4/4/2024 FU 8/21/2024

## 2024-06-19 ENCOUNTER — PATIENT MESSAGE (OUTPATIENT)
Facility: CLINIC | Age: 69
End: 2024-06-19

## 2024-06-19 RX ORDER — SCOLOPAMINE TRANSDERMAL SYSTEM 1 MG/1
1 PATCH, EXTENDED RELEASE TRANSDERMAL
Qty: 5 PATCH | Refills: 0 | Status: SHIPPED | OUTPATIENT
Start: 2024-06-19

## 2024-06-19 NOTE — TELEPHONE ENCOUNTER
From: Cydney Mercado  To: Dr. Juliano Norris  Sent: 6/19/2024 10:44 AM EDT  Subject: Upcoming Cruise    We are going on a cruise to [facility] on July 6 for two weeks. Can I get a prescription for the patch for motion sickness? Thanks. Cydney

## 2024-09-11 RX ORDER — ATORVASTATIN CALCIUM 40 MG/1
40 TABLET, FILM COATED ORAL NIGHTLY
Qty: 90 TABLET | Refills: 0 | Status: SHIPPED | OUTPATIENT
Start: 2024-09-11

## 2024-09-11 RX ORDER — ERGOCALCIFEROL 1.25 MG/1
50000 CAPSULE, LIQUID FILLED ORAL
Qty: 12 CAPSULE | Refills: 0 | Status: SHIPPED | OUTPATIENT
Start: 2024-09-11

## 2024-09-16 RX ORDER — VENLAFAXINE HYDROCHLORIDE 75 MG/1
CAPSULE, EXTENDED RELEASE ORAL
Qty: 14 CAPSULE | Refills: 0 | Status: SHIPPED | OUTPATIENT
Start: 2024-09-16

## 2024-10-03 ENCOUNTER — HOSPITAL ENCOUNTER (OUTPATIENT)
Facility: HOSPITAL | Age: 69
Discharge: HOME | End: 2024-10-06
Payer: MEDICARE

## 2024-10-03 DIAGNOSIS — R73.01 IMPAIRED FASTING GLUCOSE: ICD-10-CM

## 2024-10-03 DIAGNOSIS — E55.9 VITAMIN D DEFICIENCY, UNSPECIFIED: ICD-10-CM

## 2024-10-03 DIAGNOSIS — E78.5 HYPERLIPIDEMIA, UNSPECIFIED HYPERLIPIDEMIA TYPE: ICD-10-CM

## 2024-10-03 DIAGNOSIS — K76.0 FATTY (CHANGE OF) LIVER, NOT ELSEWHERE CLASSIFIED: ICD-10-CM

## 2024-10-03 LAB
25(OH)D3 SERPL-MCNC: 75.3 NG/ML (ref 30–100)
ALBUMIN SERPL-MCNC: 3.8 G/DL (ref 3.4–5)
ALBUMIN/GLOB SERPL: 1 (ref 0.8–1.7)
ALP SERPL-CCNC: 95 U/L (ref 45–117)
ALT SERPL-CCNC: 48 U/L (ref 13–56)
ANION GAP SERPL CALC-SCNC: 4 MMOL/L (ref 3–18)
AST SERPL-CCNC: 27 U/L (ref 10–38)
BASOPHILS # BLD: 0.1 K/UL (ref 0–0.1)
BASOPHILS NFR BLD: 1 % (ref 0–2)
BILIRUB SERPL-MCNC: 0.9 MG/DL (ref 0.2–1)
BUN SERPL-MCNC: 12 MG/DL (ref 7–18)
BUN/CREAT SERPL: 16 (ref 12–20)
CALCIUM SERPL-MCNC: 9.2 MG/DL (ref 8.5–10.1)
CHLORIDE SERPL-SCNC: 108 MMOL/L (ref 100–111)
CHOLEST SERPL-MCNC: 201 MG/DL
CO2 SERPL-SCNC: 29 MMOL/L (ref 21–32)
CREAT SERPL-MCNC: 0.75 MG/DL (ref 0.6–1.3)
DIFFERENTIAL METHOD BLD: ABNORMAL
EOSINOPHIL # BLD: 0.4 K/UL (ref 0–0.4)
EOSINOPHIL NFR BLD: 4 % (ref 0–5)
ERYTHROCYTE [DISTWIDTH] IN BLOOD BY AUTOMATED COUNT: 13.8 % (ref 11.6–14.5)
EST. AVERAGE GLUCOSE BLD GHB EST-MCNC: 137 MG/DL
GLOBULIN SER CALC-MCNC: 3.8 G/DL (ref 2–4)
GLUCOSE SERPL-MCNC: 111 MG/DL (ref 74–99)
HBA1C MFR BLD: 6.4 % (ref 4.2–5.6)
HCT VFR BLD AUTO: 45.3 % (ref 35–45)
HDLC SERPL-MCNC: 50 MG/DL (ref 40–60)
HDLC SERPL: 4 (ref 0–5)
HGB BLD-MCNC: 14 G/DL (ref 12–16)
IMM GRANULOCYTES # BLD AUTO: 0 K/UL (ref 0–0.04)
IMM GRANULOCYTES NFR BLD AUTO: 0 % (ref 0–0.5)
LDLC SERPL CALC-MCNC: 120.4 MG/DL (ref 0–100)
LIPID PANEL: ABNORMAL
LYMPHOCYTES # BLD: 2.8 K/UL (ref 0.9–3.6)
LYMPHOCYTES NFR BLD: 32 % (ref 21–52)
MCH RBC QN AUTO: 27.6 PG (ref 24–34)
MCHC RBC AUTO-ENTMCNC: 30.9 G/DL (ref 31–37)
MCV RBC AUTO: 89.3 FL (ref 78–100)
MONOCYTES # BLD: 0.7 K/UL (ref 0.05–1.2)
MONOCYTES NFR BLD: 7 % (ref 3–10)
NEUTS SEG # BLD: 4.9 K/UL (ref 1.8–8)
NEUTS SEG NFR BLD: 56 % (ref 40–73)
NRBC # BLD: 0 K/UL (ref 0–0.01)
NRBC BLD-RTO: 0 PER 100 WBC
PLATELET # BLD AUTO: 317 K/UL (ref 135–420)
PMV BLD AUTO: 11.6 FL (ref 9.2–11.8)
POTASSIUM SERPL-SCNC: 4.6 MMOL/L (ref 3.5–5.5)
PROT SERPL-MCNC: 7.6 G/DL (ref 6.4–8.2)
RBC # BLD AUTO: 5.07 M/UL (ref 4.2–5.3)
SODIUM SERPL-SCNC: 141 MMOL/L (ref 136–145)
TRIGL SERPL-MCNC: 153 MG/DL
VLDLC SERPL CALC-MCNC: 30.6 MG/DL
WBC # BLD AUTO: 8.9 K/UL (ref 4.6–13.2)

## 2024-10-03 PROCEDURE — 85025 COMPLETE CBC W/AUTO DIFF WBC: CPT

## 2024-10-03 PROCEDURE — 80053 COMPREHEN METABOLIC PANEL: CPT

## 2024-10-03 PROCEDURE — 83036 HEMOGLOBIN GLYCOSYLATED A1C: CPT

## 2024-10-03 PROCEDURE — 82306 VITAMIN D 25 HYDROXY: CPT

## 2024-10-03 PROCEDURE — 36415 COLL VENOUS BLD VENIPUNCTURE: CPT

## 2024-10-03 PROCEDURE — 80061 LIPID PANEL: CPT

## 2024-10-04 NOTE — PATIENT INSTRUCTIONS

## 2024-10-04 NOTE — PROGRESS NOTES
Chief Complaint   Patient presents with    Medicare AWV      \"Have you been to the ER, urgent care clinic since your last visit?  Hospitalized since your last visit?\"    NO    “Have you seen or consulted any other health care providers outside our system since your last visit?”    NO    Have you had a mammogram?”   NO    Date of last Mammogram: 6/9/2022

## 2024-10-07 SDOH — HEALTH STABILITY: PHYSICAL HEALTH: ON AVERAGE, HOW MANY MINUTES DO YOU ENGAGE IN EXERCISE AT THIS LEVEL?: 20 MIN

## 2024-10-07 SDOH — HEALTH STABILITY: PHYSICAL HEALTH: ON AVERAGE, HOW MANY DAYS PER WEEK DO YOU ENGAGE IN MODERATE TO STRENUOUS EXERCISE (LIKE A BRISK WALK)?: 5 DAYS

## 2024-10-07 SDOH — ECONOMIC STABILITY: FOOD INSECURITY: WITHIN THE PAST 12 MONTHS, YOU WORRIED THAT YOUR FOOD WOULD RUN OUT BEFORE YOU GOT MONEY TO BUY MORE.: NEVER TRUE

## 2024-10-07 SDOH — ECONOMIC STABILITY: INCOME INSECURITY: HOW HARD IS IT FOR YOU TO PAY FOR THE VERY BASICS LIKE FOOD, HOUSING, MEDICAL CARE, AND HEATING?: NOT VERY HARD

## 2024-10-07 SDOH — ECONOMIC STABILITY: FOOD INSECURITY: WITHIN THE PAST 12 MONTHS, THE FOOD YOU BOUGHT JUST DIDN'T LAST AND YOU DIDN'T HAVE MONEY TO GET MORE.: NEVER TRUE

## 2024-10-07 ASSESSMENT — LIFESTYLE VARIABLES
HOW OFTEN DO YOU HAVE SIX OR MORE DRINKS ON ONE OCCASION: 1
HOW MANY STANDARD DRINKS CONTAINING ALCOHOL DO YOU HAVE ON A TYPICAL DAY: 1 OR 2
HOW MANY STANDARD DRINKS CONTAINING ALCOHOL DO YOU HAVE ON A TYPICAL DAY: 1
HOW OFTEN DO YOU HAVE A DRINK CONTAINING ALCOHOL: 2-4 TIMES A MONTH
HOW OFTEN DO YOU HAVE A DRINK CONTAINING ALCOHOL: 3

## 2024-10-07 ASSESSMENT — PATIENT HEALTH QUESTIONNAIRE - PHQ9
SUM OF ALL RESPONSES TO PHQ QUESTIONS 1-9: 0
2. FEELING DOWN, DEPRESSED OR HOPELESS: NOT AT ALL
SUM OF ALL RESPONSES TO PHQ QUESTIONS 1-9: 0
1. LITTLE INTEREST OR PLEASURE IN DOING THINGS: NOT AT ALL
SUM OF ALL RESPONSES TO PHQ QUESTIONS 1-9: 0
SUM OF ALL RESPONSES TO PHQ9 QUESTIONS 1 & 2: 0
SUM OF ALL RESPONSES TO PHQ QUESTIONS 1-9: 0

## 2024-10-08 ENCOUNTER — OFFICE VISIT (OUTPATIENT)
Facility: CLINIC | Age: 69
End: 2024-10-08

## 2024-10-08 ENCOUNTER — OFFICE VISIT (OUTPATIENT)
Facility: CLINIC | Age: 69
End: 2024-10-08
Payer: MEDICARE

## 2024-10-08 VITALS
BODY MASS INDEX: 30.3 KG/M2 | RESPIRATION RATE: 16 BRPM | WEIGHT: 171 LBS | TEMPERATURE: 98.6 F | HEIGHT: 63 IN | HEART RATE: 73 BPM | SYSTOLIC BLOOD PRESSURE: 118 MMHG | OXYGEN SATURATION: 97 % | DIASTOLIC BLOOD PRESSURE: 80 MMHG

## 2024-10-08 VITALS
OXYGEN SATURATION: 97 % | HEART RATE: 73 BPM | WEIGHT: 171 LBS | DIASTOLIC BLOOD PRESSURE: 80 MMHG | RESPIRATION RATE: 16 BRPM | BODY MASS INDEX: 30.3 KG/M2 | TEMPERATURE: 98.6 F | HEIGHT: 63 IN | SYSTOLIC BLOOD PRESSURE: 118 MMHG

## 2024-10-08 DIAGNOSIS — F41.1 GENERALIZED ANXIETY DISORDER: ICD-10-CM

## 2024-10-08 DIAGNOSIS — Z71.89 ACP (ADVANCE CARE PLANNING): ICD-10-CM

## 2024-10-08 DIAGNOSIS — E78.5 HYPERLIPIDEMIA, UNSPECIFIED HYPERLIPIDEMIA TYPE: ICD-10-CM

## 2024-10-08 DIAGNOSIS — E55.9 VITAMIN D DEFICIENCY, UNSPECIFIED: ICD-10-CM

## 2024-10-08 DIAGNOSIS — K76.0 FATTY (CHANGE OF) LIVER, NOT ELSEWHERE CLASSIFIED: ICD-10-CM

## 2024-10-08 DIAGNOSIS — M54.16 LUMBAR RADICULITIS: ICD-10-CM

## 2024-10-08 DIAGNOSIS — Z23 NEED FOR IMMUNIZATION AGAINST INFLUENZA: ICD-10-CM

## 2024-10-08 DIAGNOSIS — R73.01 IMPAIRED FASTING GLUCOSE: Primary | ICD-10-CM

## 2024-10-08 DIAGNOSIS — G43.809 OTHER MIGRAINE, NOT INTRACTABLE, WITHOUT STATUS MIGRAINOSUS: ICD-10-CM

## 2024-10-08 DIAGNOSIS — Z00.00 MEDICARE ANNUAL WELLNESS VISIT, SUBSEQUENT: Primary | ICD-10-CM

## 2024-10-08 PROCEDURE — G0008 ADMIN INFLUENZA VIRUS VAC: HCPCS | Performed by: FAMILY MEDICINE

## 2024-10-08 PROCEDURE — G8417 CALC BMI ABV UP PARAM F/U: HCPCS | Performed by: FAMILY MEDICINE

## 2024-10-08 PROCEDURE — 1090F PRES/ABSN URINE INCON ASSESS: CPT | Performed by: FAMILY MEDICINE

## 2024-10-08 PROCEDURE — 90653 IIV ADJUVANT VACCINE IM: CPT | Performed by: FAMILY MEDICINE

## 2024-10-08 PROCEDURE — G8399 PT W/DXA RESULTS DOCUMENT: HCPCS | Performed by: FAMILY MEDICINE

## 2024-10-08 PROCEDURE — G8427 DOCREV CUR MEDS BY ELIG CLIN: HCPCS | Performed by: FAMILY MEDICINE

## 2024-10-08 PROCEDURE — G8482 FLU IMMUNIZE ORDER/ADMIN: HCPCS | Performed by: FAMILY MEDICINE

## 2024-10-08 PROCEDURE — 1036F TOBACCO NON-USER: CPT | Performed by: FAMILY MEDICINE

## 2024-10-08 PROCEDURE — 99214 OFFICE O/P EST MOD 30 MIN: CPT | Performed by: FAMILY MEDICINE

## 2024-10-08 PROCEDURE — 3017F COLORECTAL CA SCREEN DOC REV: CPT | Performed by: FAMILY MEDICINE

## 2024-10-08 PROCEDURE — 1123F ACP DISCUSS/DSCN MKR DOCD: CPT | Performed by: FAMILY MEDICINE

## 2024-10-08 RX ORDER — VENLAFAXINE HYDROCHLORIDE 75 MG/1
CAPSULE, EXTENDED RELEASE ORAL
Qty: 90 CAPSULE | Refills: 3 | Status: SHIPPED | OUTPATIENT
Start: 2024-10-08

## 2024-10-08 ASSESSMENT — ANXIETY QUESTIONNAIRES
4. TROUBLE RELAXING: SEVERAL DAYS
6. BECOMING EASILY ANNOYED OR IRRITABLE: NOT AT ALL
1. FEELING NERVOUS, ANXIOUS, OR ON EDGE: SEVERAL DAYS
IF YOU CHECKED OFF ANY PROBLEMS ON THIS QUESTIONNAIRE, HOW DIFFICULT HAVE THESE PROBLEMS MADE IT FOR YOU TO DO YOUR WORK, TAKE CARE OF THINGS AT HOME, OR GET ALONG WITH OTHER PEOPLE: SOMEWHAT DIFFICULT
5. BEING SO RESTLESS THAT IT IS HARD TO SIT STILL: NOT AT ALL
7. FEELING AFRAID AS IF SOMETHING AWFUL MIGHT HAPPEN: NOT AT ALL
GAD7 TOTAL SCORE: 3
3. WORRYING TOO MUCH ABOUT DIFFERENT THINGS: SEVERAL DAYS

## 2024-10-08 NOTE — ACP (ADVANCE CARE PLANNING)
Advance Care Planning   The patient has the following advanced directives on file:    Primary Decision Maker: Quentin Mercado - Spouse - [phone]    Has at home.  Was made about 10 years ago.  Wants to update current and then provide to us for review.    The patient has appointed the following active healthcare agents:    Primary Decision Maker: Quentin Mercado - Spouse - [phone]    The Patient has the following current code status:  She is unsure what is in her living will which is at home.    She sates she is an organ donor.    Visit Documentation:  I discussed Advance Care Planning with yCdney Mercado today which included the importance of making their choices for care and treatment in the case of a health event that adversely affects their decision-making abilities. She has not completed the Advance Care Directives. She has an active health care agent at this time.  Cydney Mercado was encouraged to complete the declaration forms and provide a signed copy of her medical records. I advised patient we would continue this discussion at future visits.     Goals of medical treatment were reviewed .   The discussion lasted 16 minutes.    Juliano Norris MD  10/8/2024

## 2024-10-08 NOTE — PROGRESS NOTES
Chief Complaint   Patient presents with    Medicare AWV     Medicare Annual Wellness Visit    Cydney Mercado is here for Medicare AWV    Assessment & Plan   Medicare annual wellness visit, subsequent  ACP (advance care planning)  -     [place] Advanced Care Planning (16-30 minutes) [96390]  Recommendations for Preventive Services Due: see orders and patient instructions/AVS.  Recommended screening schedule for the next 5-10 years is provided to the patient in written form: see Patient Instructions/AVS.     ACP discussion   AWV annually.     Subjective     Patient's complete Health Risk Assessment and screening values have been reviewed and are found in Flowsheets. The following problems were reviewed today and where indicated follow up appointments were made and/or referrals ordered.    Positive Risk Factor Screenings with Interventions:                  Abnormal BMI (obese):  Body mass index is 30.29 kg/m². (!) Abnormal  Interventions:  See AVS for additional education material              Objective   Vitals:    10/08/24 0933   BP: 118/80   Site: Right Upper Arm   Position: Sitting   Cuff Size: Large Adult   Pulse: 73   Resp: 16   Temp: 98.6 °F (37 °C)   TempSrc: Tympanic   SpO2: 97%   Weight: 77.6 kg (171 lb)   Height: 1.6 m (5' 3\")      Body mass index is 30.29 kg/m².               No Known Allergies  Prior to Visit Medications    Medication Sig Taking? Authorizing Provider   vitamin D (ERGOCALCIFEROL) 1.25 MG (34590 UT) CAPS capsule Take 1 capsule by mouth every 7 days Yes Juliano Norris MD   atorvastatin (LIPITOR) 40 MG tablet Take 1 tablet by mouth nightly Yes Juliano Norris MD   Collagen-Boron-Hyaluronic Acid (MOVE FREE ULTRA JOINT HEALTH PO) Take 1 tablet by mouth daily Yes Provider, MD Morris   Multiple Vitamin (MULTIVITAMIN PO) Take 1 tablet by mouth daily Yes Automatic Reconciliation, Ar   venlafaxine (EFFEXOR XR) 75 MG extended release capsule TAKE 1 CAPSULE IN THE MORNING  Juliano Norris MD   ZOLNavinriptaadonis

## 2024-10-08 NOTE — PROGRESS NOTES
SUBJECTIVE  Chief Complaint   Patient presents with    Results     Review of results     Vitamin D Def    Migraine    IFG    Fatty Liver    Cholesterol Problem    Anxiety     Follow-up for lab review for hypercholesterolemia, anxiety, fatty liver, and hypovitaminosis D.      Lumbar radiculopathy improved with formal PT.    Hypercholesterolemia - Taking Lipitor 40mg with no side effects.  No myalgias or cramping reported.  No chest pain or dyspnea.     The patient's anxiety is controlled on Effexor.  No side effects noted.  No longer on trazodone and sleep is good now.    Takes Zomig as needed for rare migraines.  Has had complete neuro work-up without any further episodes of transient amnesia.     OBJECTIVE    Blood pressure 118/80, pulse 73, temperature 98.6 °F (37 °C), temperature source Tympanic, resp. rate 16, height 1.6 m (5' 3\"), weight 77.6 kg (171 lb), SpO2 97%.    General:  Alert, cooperative, well appearing, in no apparent distress.  Chest: clear, no w/r/r.  Heart: normal s1s2, RRR, no murmurs.  Back:  Nontender.  Positive SLR on the right.   Ext: no swelling present.   Psych: normal affect.  Mood good.  Oriented x 3.  Judgement and insight intact.      Latest Reference Range & Units 10/03/24 08:57   Sodium 136 - 145 mmol/L 141   Potassium 3.5 - 5.5 mmol/L 4.6   Chloride 100 - 111 mmol/L 108   CARBON DIOXIDE 21 - 32 mmol/L 29   BUN,BUNPL 7.0 - 18 MG/DL 12   Creatinine 0.6 - 1.3 MG/DL 0.75   Bun/Cre 12 - 20   16   Anion Gap 3.0 - 18 mmol/L 4   Est, Glom Filt Rate >60 ml/min/1.73m2 87   Glucose 74 - 99 mg/dL 111 (H)   Calcium 8.5 - 10.1 MG/DL 9.2   Albumin/Globulin Ratio 0.8 - 1.7   1.0   Total Protein 6.4 - 8.2 g/dL 7.6   LIPID PANEL -       Chol/HDL Ratio 0 - 5.0   4.0   Cholesterol, Total <200 MG/ (H)   HDL Cholesterol 40 - 60 MG/DL 50   LDL Cholesterol 0 - 100 MG/.4 (H)   Triglycerides <150 MG/ (H)   VLDL MG/DL 30.6   Albumin 3.4 - 5.0 g/dL 3.8   Globulin 2.0 - 4.0 g/dL 3.8   Alkaline

## 2024-10-08 NOTE — PATIENT INSTRUCTIONS
learn more?  Go to https://www.Listen [email or web address].net/patientEd and enter H931 to learn more about \"Migraine Aura Without a Headache: Care Instructions.\"  Current as of: December 20, 2023  Content Version: 14.2  © 2024 [employer] and some sugar.   Care instructions adapted under license by [employer]. If you have questions about a medical condition or this instruction, always ask your healthcare professional. [employer] disclaims any warranty or liability for your use of this information.       Patient Education        Generalized Anxiety Disorder: Care Instructions  Overview     We all worry. It's a normal part of life. But when you have generalized anxiety disorder, you worry about lots of things. You have a hard time not worrying. This worry or anxiety interferes with your relationships, work or school, and other areas of your life.  You may worry most days about things like money, health, work, or friends. That may make you feel tired, tense, or cranky. It can make it hard to think. It may get in the way of healthy sleep.  Counseling and medicine can both work to treat anxiety. They are often used together with lifestyle changes, such as getting enough sleep. Treatment can include a type of counseling called cognitive behavioral therapy, or CBT. It helps you notice and replace thoughts that make you worry. You also might have counseling along with those closest to you so that they can help.  Follow-up care is a key part of your treatment and safety. Be sure to make and go to all appointments, and call your doctor if you are having problems. It's also a good idea to know your test results and keep a list of the medicines you take.  How can you care for yourself at home?  Get at least 30 minutes of exercise on most days of the week. Walking is a good choice. You also may want to do other activities, such as running, swimming, cycling, or playing tennis or team sports.  Learn and do relaxation exercises, such

## 2024-10-08 NOTE — PROGRESS NOTES
Chief Complaint   Patient presents with    Results     Review of results     Vitamin D Def    Migraine    IFG    Fatty Liver    Cholesterol Problem    Anxiety      \"Have you been to the ER, urgent care clinic since your last visit?  Hospitalized since your last visit?\"    NO    “Have you seen or consulted any other health care providers outside our system since your last visit?”    NO    Have you had a mammogram?”   NO    Date of last Mammogram: 6/9/2022     Physician order obtained. Patient completed adult immunization consent form.  Allergies, contraindications and recommendations reviewed with patient. Seasonal influenza vaccine administered IM right  deltoid.  Patient tolerated well.  Patient remained in office for 15 minutes after injection and no adverse reactions were noted.     Lot # [id number]  Exp Date: 05/02/2025  NDC # 87099-134-18

## 2024-12-09 RX ORDER — ERGOCALCIFEROL 1.25 MG/1
50000 CAPSULE, LIQUID FILLED ORAL
Qty: 12 CAPSULE | Refills: 3 | Status: SHIPPED | OUTPATIENT
Start: 2024-12-09

## 2024-12-12 RX ORDER — ATORVASTATIN CALCIUM 40 MG/1
40 TABLET, FILM COATED ORAL DAILY
Qty: 90 TABLET | Refills: 1 | Status: SHIPPED | OUTPATIENT
Start: 2024-12-12

## 2025-04-07 SDOH — ECONOMIC STABILITY: FOOD INSECURITY: WITHIN THE PAST 12 MONTHS, THE FOOD YOU BOUGHT JUST DIDN'T LAST AND YOU DIDN'T HAVE MONEY TO GET MORE.: NEVER TRUE

## 2025-04-07 SDOH — ECONOMIC STABILITY: FOOD INSECURITY: WITHIN THE PAST 12 MONTHS, YOU WORRIED THAT YOUR FOOD WOULD RUN OUT BEFORE YOU GOT MONEY TO BUY MORE.: NEVER TRUE

## 2025-04-07 SDOH — ECONOMIC STABILITY: INCOME INSECURITY: IN THE LAST 12 MONTHS, WAS THERE A TIME WHEN YOU WERE NOT ABLE TO PAY THE MORTGAGE OR RENT ON TIME?: NO

## 2025-04-07 SDOH — ECONOMIC STABILITY: TRANSPORTATION INSECURITY
IN THE PAST 12 MONTHS, HAS THE LACK OF TRANSPORTATION KEPT YOU FROM MEDICAL APPOINTMENTS OR FROM GETTING MEDICATIONS?: NO

## 2025-04-07 NOTE — PATIENT INSTRUCTIONS
getting it.  Follow-up care is a key part of your treatment and safety. Be sure to make and go to all appointments, and call your doctor if you are having problems. It's also a good idea to know your test results and keep a list of the medicines you take.  Where can you learn more?  Go to https://www.HealthQx.net/patientEd and enter I222 to learn more about \"Prediabetes: Care Instructions.\"  Current as of: April 30, 2024  Content Version: 14.4  © 8524-0809 [employer].   Care instructions adapted under license by [employer]. If you have questions about a medical condition or this instruction, always ask your healthcare professional. [employer] disclaims any warranty or liability for your use of this information.

## 2025-04-08 ENCOUNTER — OFFICE VISIT (OUTPATIENT)
Facility: CLINIC | Age: 70
End: 2025-04-08
Payer: MEDICARE

## 2025-04-08 VITALS
RESPIRATION RATE: 18 BRPM | DIASTOLIC BLOOD PRESSURE: 84 MMHG | OXYGEN SATURATION: 98 % | HEIGHT: 63 IN | SYSTOLIC BLOOD PRESSURE: 110 MMHG | TEMPERATURE: 98.5 F | BODY MASS INDEX: 29.95 KG/M2 | WEIGHT: 169 LBS | HEART RATE: 94 BPM

## 2025-04-08 DIAGNOSIS — F41.1 GENERALIZED ANXIETY DISORDER: ICD-10-CM

## 2025-04-08 DIAGNOSIS — G43.809 OTHER MIGRAINE, NOT INTRACTABLE, WITHOUT STATUS MIGRAINOSUS: ICD-10-CM

## 2025-04-08 DIAGNOSIS — Z12.31 ENCOUNTER FOR SCREENING MAMMOGRAM FOR MALIGNANT NEOPLASM OF BREAST: ICD-10-CM

## 2025-04-08 DIAGNOSIS — E55.9 VITAMIN D DEFICIENCY, UNSPECIFIED: ICD-10-CM

## 2025-04-08 DIAGNOSIS — E78.5 HYPERLIPIDEMIA, UNSPECIFIED HYPERLIPIDEMIA TYPE: ICD-10-CM

## 2025-04-08 DIAGNOSIS — R73.01 IMPAIRED FASTING GLUCOSE: Primary | ICD-10-CM

## 2025-04-08 DIAGNOSIS — G47.00 INSOMNIA, UNSPECIFIED TYPE: ICD-10-CM

## 2025-04-08 DIAGNOSIS — K76.0 FATTY (CHANGE OF) LIVER, NOT ELSEWHERE CLASSIFIED: ICD-10-CM

## 2025-04-08 DIAGNOSIS — M54.16 LUMBAR RADICULITIS: ICD-10-CM

## 2025-04-08 LAB — HBA1C MFR BLD: 6.3 %

## 2025-04-08 PROCEDURE — G8399 PT W/DXA RESULTS DOCUMENT: HCPCS | Performed by: FAMILY MEDICINE

## 2025-04-08 PROCEDURE — 99214 OFFICE O/P EST MOD 30 MIN: CPT | Performed by: FAMILY MEDICINE

## 2025-04-08 PROCEDURE — G8427 DOCREV CUR MEDS BY ELIG CLIN: HCPCS | Performed by: FAMILY MEDICINE

## 2025-04-08 PROCEDURE — 1159F MED LIST DOCD IN RCRD: CPT | Performed by: FAMILY MEDICINE

## 2025-04-08 PROCEDURE — 3017F COLORECTAL CA SCREEN DOC REV: CPT | Performed by: FAMILY MEDICINE

## 2025-04-08 PROCEDURE — 1090F PRES/ABSN URINE INCON ASSESS: CPT | Performed by: FAMILY MEDICINE

## 2025-04-08 PROCEDURE — 83036 HEMOGLOBIN GLYCOSYLATED A1C: CPT | Performed by: FAMILY MEDICINE

## 2025-04-08 PROCEDURE — G8417 CALC BMI ABV UP PARAM F/U: HCPCS | Performed by: FAMILY MEDICINE

## 2025-04-08 PROCEDURE — 1036F TOBACCO NON-USER: CPT | Performed by: FAMILY MEDICINE

## 2025-04-08 PROCEDURE — 1126F AMNT PAIN NOTED NONE PRSNT: CPT | Performed by: FAMILY MEDICINE

## 2025-04-08 PROCEDURE — 1123F ACP DISCUSS/DSCN MKR DOCD: CPT | Performed by: FAMILY MEDICINE

## 2025-04-08 PROCEDURE — 1160F RVW MEDS BY RX/DR IN RCRD: CPT | Performed by: FAMILY MEDICINE

## 2025-04-08 RX ORDER — VENLAFAXINE 37.5 MG/1
37.5 TABLET ORAL
COMMUNITY

## 2025-04-08 ASSESSMENT — ANXIETY QUESTIONNAIRES
5. BEING SO RESTLESS THAT IT IS HARD TO SIT STILL: SEVERAL DAYS
6. BECOMING EASILY ANNOYED OR IRRITABLE: SEVERAL DAYS
2. NOT BEING ABLE TO STOP OR CONTROL WORRYING: SEVERAL DAYS
IF YOU CHECKED OFF ANY PROBLEMS ON THIS QUESTIONNAIRE, HOW DIFFICULT HAVE THESE PROBLEMS MADE IT FOR YOU TO DO YOUR WORK, TAKE CARE OF THINGS AT HOME, OR GET ALONG WITH OTHER PEOPLE: SOMEWHAT DIFFICULT
3. WORRYING TOO MUCH ABOUT DIFFERENT THINGS: SEVERAL DAYS
7. FEELING AFRAID AS IF SOMETHING AWFUL MIGHT HAPPEN: SEVERAL DAYS
4. TROUBLE RELAXING: SEVERAL DAYS
GAD7 TOTAL SCORE: 8
1. FEELING NERVOUS, ANXIOUS, OR ON EDGE: MORE THAN HALF THE DAYS

## 2025-04-08 ASSESSMENT — PATIENT HEALTH QUESTIONNAIRE - PHQ9
SUM OF ALL RESPONSES TO PHQ QUESTIONS 1-9: 0
1. LITTLE INTEREST OR PLEASURE IN DOING THINGS: NOT AT ALL
2. FEELING DOWN, DEPRESSED OR HOPELESS: NOT AT ALL
SUM OF ALL RESPONSES TO PHQ QUESTIONS 1-9: 0

## 2025-04-08 NOTE — PROGRESS NOTES
Chief Complaint   Patient presents with    Anxiety    Fatty Liver    Hyperlipidemia    IFG    Migraine    Vitamin D Deficiency      \"Have you been to the ER, urgent care clinic since your last visit?  Hospitalized since your last visit?\"    NO    “Have you seen or consulted any other health care providers outside our system since your last visit?”    NO    Have you had a mammogram?”   NO - advised that she is overdue at today's visit 04/08/2025    Date of last Mammogram: 6/9/2022     No updated immunization record noted on [place] Immunization Registry as of 04/07/2025       
Panel      7. Other migraine, not intractable, without status migrainosus        8. Lumbar radiculitis        9. Encounter for screening mammogram for malignant neoplasm of breast  [facility] DIGITAL SCREEN BILATERAL        IFG - reviewed A1c.  Advised on exercise and carb reduction.  Advised on checking A1c in 6 months.    Hyperlipidemia - Continue Lipitor 40mg nightly.  No hepatotoxicity.  Cont diet and exercise.      Anxiety -  Increased anxiety.  She has increased her Effexor.  She will let us know if she wants to go to 150mg.  Refills as needed.    Insomnia - trazodone refills as needed.    Vit D def - controlled on vitamin D 50,000 weekly.       Fatty liver -  Encourage diet and exercise.  Track LFTs annually.    Migraines - Zomig as needed for migraines.    Lumbar radiculopathy - resolved after PT.  We will monitor for now.    Advised on importance of CPE / well woman exam with OB/GYN once again.  She says she knows she needs to.  Also advised on seeing derm for annual skin surveillance.   Mammogram ordered once again since order .     All chart history elements were reviewed by me at the time of the visit even though marked at time of note closure. Patient understands our medical plan. Patient has provided input and agrees with goals. Alternatives have been explained and offered.  All questions answered.  The patient is to call if condition worsens or fails to improve.     Return in about 6 months (around 10/8/2025) for annual medicare wellness / routine care, fasting labs to be completed 3-5 days prior.

## 2025-06-11 RX ORDER — ATORVASTATIN CALCIUM 40 MG/1
40 TABLET, FILM COATED ORAL DAILY
Qty: 90 TABLET | Refills: 1 | Status: SHIPPED | OUTPATIENT
Start: 2025-06-11

## 2025-06-23 RX ORDER — VENLAFAXINE 37.5 MG/1
37.5 TABLET ORAL DAILY
Qty: 90 TABLET | Refills: 1 | Status: SHIPPED | OUTPATIENT
Start: 2025-06-23